# Patient Record
Sex: FEMALE | Employment: UNEMPLOYED | URBAN - METROPOLITAN AREA
[De-identification: names, ages, dates, MRNs, and addresses within clinical notes are randomized per-mention and may not be internally consistent; named-entity substitution may affect disease eponyms.]

---
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
## 2024-01-22 ENCOUNTER — OFFICE VISIT (OUTPATIENT)
Dept: OBGYN CLINIC | Facility: CLINIC | Age: 19
End: 2024-01-22
Payer: MEDICAID

## 2024-01-22 VITALS
WEIGHT: 155 LBS | BODY MASS INDEX: 27.46 KG/M2 | HEART RATE: 74 BPM | DIASTOLIC BLOOD PRESSURE: 65 MMHG | HEIGHT: 63 IN | SYSTOLIC BLOOD PRESSURE: 101 MMHG

## 2024-01-22 DIAGNOSIS — Z30.431 IUD CHECK UP: Primary | ICD-10-CM

## 2024-01-22 PROCEDURE — 99212 OFFICE O/P EST SF 10 MIN: CPT | Performed by: ADVANCED PRACTICE MIDWIFE

## 2024-01-22 PROCEDURE — 3078F DIAST BP <80 MM HG: CPT | Performed by: ADVANCED PRACTICE MIDWIFE

## 2024-01-22 PROCEDURE — 3074F SYST BP LT 130 MM HG: CPT | Performed by: ADVANCED PRACTICE MIDWIFE

## 2024-01-22 PROCEDURE — 3008F BODY MASS INDEX DOCD: CPT | Performed by: ADVANCED PRACTICE MIDWIFE

## 2024-01-23 NOTE — PROGRESS NOTES
Subjective:   Patient ID: Kira Don is a 18 year old female.    Kira presents for IUD check. She states she is concerned that it may have gotten pulled. She denies bleeding or pelvic pain.         History/Other:   Review of Systems   All other systems reviewed and are negative.    Current Outpatient Medications   Medication Sig Dispense Refill    miSOPROStol 200 MCG Oral Tab  (Patient not taking: Reported on 6/12/2023)       Allergies:No Known Allergies    Objective:   Physical Exam  Vitals and nursing note reviewed.   Constitutional:       General: She is not in acute distress.     Appearance: Normal appearance. She is not ill-appearing, toxic-appearing or diaphoretic.   Cardiovascular:      Pulses: Normal pulses.   Genitourinary:     General: Normal vulva.      Labia:         Right: No rash, tenderness, lesion or injury.         Left: No rash, tenderness, lesion or injury.       Vagina: No signs of injury and foreign body. No vaginal discharge, erythema, tenderness, bleeding, lesions or prolapsed vaginal walls.      Cervix: No cervical motion tenderness, discharge, friability, lesion, erythema, cervical bleeding or eversion.            Comments: IUD strings visible at cervical os. No plastic parts of IUD felt or visualized  Neurological:      Mental Status: She is alert and oriented to person, place, and time.   Psychiatric:         Mood and Affect: Mood normal.         Behavior: Behavior normal.         Thought Content: Thought content normal.         Judgment: Judgment normal.         Assessment & Plan:   1. IUD check up        No orders of the defined types were placed in this encounter.      Meds This Visit:  Requested Prescriptions      No prescriptions requested or ordered in this encounter       Imaging & Referrals:  None    Reassured patient that IUD is in place.

## 2024-06-24 ENCOUNTER — OFFICE VISIT (OUTPATIENT)
Dept: OBGYN CLINIC | Facility: CLINIC | Age: 19
End: 2024-06-24

## 2024-06-24 VITALS
HEIGHT: 63 IN | DIASTOLIC BLOOD PRESSURE: 69 MMHG | HEART RATE: 97 BPM | WEIGHT: 143 LBS | BODY MASS INDEX: 25.34 KG/M2 | SYSTOLIC BLOOD PRESSURE: 105 MMHG

## 2024-06-24 DIAGNOSIS — Z01.419 ENCOUNTER FOR ANNUAL ROUTINE GYNECOLOGICAL EXAMINATION: Primary | ICD-10-CM

## 2024-06-24 DIAGNOSIS — N89.8 VAGINAL DISCHARGE: ICD-10-CM

## 2024-06-24 PROCEDURE — 99395 PREV VISIT EST AGE 18-39: CPT | Performed by: ADVANCED PRACTICE MIDWIFE

## 2024-06-24 NOTE — PROGRESS NOTES
Chief Complaint:   Chief Complaint   Patient presents with    Annual     Annual; wanted to do a full STI panel. Wanted to check to see if IUD is in place as well. She's been seeing Brown discharge.         HPI:     Kira is 18 year old female, here today for annual exam  No LMP recorded (lmp unknown). (Menstrual status: IUD - Intrauterine Device).. Not having periods with IUD     Denies abnormal discharge or vaginal irritation.     Had bleeding with intercourse the other night.  Also has brownish discharge. Has been having intermittent pelvic pain/cramp. Denies itching/irritation.    Ex-boyfriend had cheated on her so desires STI testing    Current Partners: currently one. Feels safe in relationship.   Birth Control Method: IUD, happy with method, denies side effects  H/O of STI's: no  Last STI screen: 6/2023  Desires this testing today    HISTORY:  History reviewed. No pertinent past medical history.   History reviewed. No pertinent surgical history.   Family History   Problem Relation Age of Onset    Lipids Father     Lipids Paternal Grandmother     Lipids Paternal Grandfather     Heart Disorder Paternal Grandfather         CAD    Cancer Neg     Diabetes Neg     Hypertension Neg     Thyroid disease Neg       Social History:   Social History     Socioeconomic History    Marital status: Single   Tobacco Use    Smoking status: Never    Smokeless tobacco: Never   Substance and Sexual Activity    Alcohol use: No    Drug use: No   Other Topics Concern    Second-hand smoke exposure No    Alcohol/drug concerns No    Violence concerns No        Medications (Active prior to today's visit):  Current Outpatient Medications   Medication Sig Dispense Refill    miSOPROStol 200 MCG Oral Tab  (Patient not taking: Reported on 6/24/2024)         Allergies:  No Known Allergies    HISTORY:  History reviewed. No pertinent past medical history.   History reviewed. No pertinent surgical history.   Family History   Problem Relation Age  of Onset    Lipids Father     Lipids Paternal Grandmother     Lipids Paternal Grandfather     Heart Disorder Paternal Grandfather         CAD    Cancer Neg     Diabetes Neg     Hypertension Neg     Thyroid disease Neg       Social History:   Social History     Socioeconomic History    Marital status: Single   Tobacco Use    Smoking status: Never    Smokeless tobacco: Never   Substance and Sexual Activity    Alcohol use: No    Drug use: No   Other Topics Concern    Second-hand smoke exposure No    Alcohol/drug concerns No    Violence concerns No        Medications (Active prior to today's visit):  Current Outpatient Medications   Medication Sig Dispense Refill    miSOPROStol 200 MCG Oral Tab  (Patient not taking: Reported on 6/24/2024)         Allergies:  No Known Allergies       ROS:     Cardiovascular:  Negative forirregular heartbeat/palpitations. Negative for chest pain  Constitutional:  Negative for decreased activity, fever, irritability and lethargy  Gastrointestinal:  Negative for abdominal pain, constipation, decreased appetite, diarrhea and vomiting  Genitourinary:  Negative for dysuria and hematuria  Reproductive: Negative for vaginal discharge, itching, abnormal bleeding  Hema/Lymph:  Negative for easy bleeding and easy bruising  Neurological:  Negative for gait disturbance or dizziness  Psychiatric:  Negative for inappropriate interaction and psychiatric symptoms  Respiratory:  Negative for cough, dyspnea and wheezing      PHYSICAL EXAM:   Constitutional: appears well hydrated alert and responsive no acute distress noted  Neck/Thyroid: neck is supple without adenopathy No thyromegaly  Lymphatic: no abnormal cervical, supraclavicular or axillary adenopathy is noted  Breast: normal on inspection without masses  Respiratory: normal to inspection lungs are clear to auscultation bilaterally normal respiratory effort  Cardiovascular: regular rate and rhythm no murmurs, gallups, or rubs  Abdomen: soft  non-tender non-distended no organomegaly noted no masses  Genitourinary: normal female genitalia            Vulva/Labia: Appear normal, no lesions           Vagina: Normal. Small tan discharge           Cervix: No CMT, no mucopurulent discharge. IUD strings visible at cervix           Uterus: non-tender, no masses, not enlarged            Adnexa: not enlarged, non--tender  Rectal: anus appears normal  Musculoskeletal: Normal strength, no swelling  Integumentary: Warm, Dry, appropriate color, Intact  Neurologic: Alert, Oriented  Psychiatric: Cooperative, appropriate mood and affect            ASSESSMENT/PLAN:   Assessment   Encounter Diagnoses   Name Primary?    Vaginal discharge     Encounter for annual routine gynecological examination Yes       Normal gyne exam.   STD testing- GC/CT ordered. Declines HIV, Treponemal, Hep B ordered  Diet/excercise discussed  Vaginal culture collected due to bleeding and discharge  Reassured patient that occasional bleeding is normal with IUD.   Recommend condoms for STI prevention         Orders This Visit:  Orders Placed This Encounter   Procedures    Chlamydia/Gc Amplification    Vaginitis Vaginosis PCR Panel       Meds This Visit:  Requested Prescriptions      No prescriptions requested or ordered in this encounter       Imaging & Referrals:  None     6/24/2024  Celeste Iniguez CNM

## 2024-06-25 LAB
BV BACTERIA DNA VAG QL NAA+PROBE: NEGATIVE
C GLABRATA DNA VAG QL NAA+PROBE: NEGATIVE
C KRUSEI DNA VAG QL NAA+PROBE: NEGATIVE
C TRACH DNA SPEC QL NAA+PROBE: NEGATIVE
CANDIDA DNA VAG QL NAA+PROBE: NEGATIVE
N GONORRHOEA DNA SPEC QL NAA+PROBE: NEGATIVE
T VAGINALIS DNA VAG QL NAA+PROBE: NEGATIVE

## 2024-09-22 ENCOUNTER — HOSPITAL ENCOUNTER (OUTPATIENT)
Age: 19
Discharge: HOME | End: 2024-09-22
Payer: MEDICAID

## 2024-09-22 ENCOUNTER — APPOINTMENT (OUTPATIENT)
Dept: GENERAL RADIOLOGY | Age: 19
End: 2024-09-22
Attending: NURSE PRACTITIONER
Payer: MEDICAID

## 2024-09-22 VITALS
OXYGEN SATURATION: 100 % | HEART RATE: 78 BPM | SYSTOLIC BLOOD PRESSURE: 101 MMHG | TEMPERATURE: 98 F | DIASTOLIC BLOOD PRESSURE: 57 MMHG | RESPIRATION RATE: 20 BRPM

## 2024-09-22 DIAGNOSIS — M25.561 ACUTE PAIN OF RIGHT KNEE: Primary | ICD-10-CM

## 2024-09-22 PROCEDURE — 99213 OFFICE O/P EST LOW 20 MIN: CPT | Performed by: NURSE PRACTITIONER

## 2024-09-22 PROCEDURE — 73560 X-RAY EXAM OF KNEE 1 OR 2: CPT | Performed by: NURSE PRACTITIONER

## 2024-09-22 PROCEDURE — A6450 LT COMPRES BAND >=5"/YD: HCPCS | Performed by: NURSE PRACTITIONER

## 2024-09-22 NOTE — DISCHARGE INSTRUCTIONS
Ace wrap during the day take off at night  Ibuprofen 600 mg every 6 hours as needed, take with food  Follow-up with Ju Terry as discussed

## 2024-09-22 NOTE — ED PROVIDER NOTES
Patient Seen in: [facility]      History     Chief Complaint   Patient presents with    Knee Pain     Stated Complaint: right knee pain    Subjective:   Patient is a 18-year-old female who presents today for right knee pain ongoing for 3 days.  No injury or trauma.  No numbness or tingling.  Does report she feels like her knee sometimes gives out.        Objective:   History reviewed. No pertinent past medical history.           History reviewed. No pertinent surgical history.             Social History     Socioeconomic History    Marital status: Single   Tobacco Use    Smoking status: Never    Smokeless tobacco: Never   Substance and Sexual Activity    Alcohol use: No    Drug use: No   Other Topics Concern    Second-hand smoke exposure No    Alcohol/drug concerns No    Violence concerns No              Review of Systems   All other systems reviewed and are negative.      Positive for stated Chief Complaint: Knee Pain    Other systems are as noted in HPI.  Constitutional and vital signs reviewed.      All other systems reviewed and negative except as noted above.    Physical Exam     ED Triage Vitals [09/22/24 1054]   /57   Pulse 78   Resp 20   Temp 97.8 °F (36.6 °C)   Temp src Temporal   SpO2 100 %   O2 Device None (Room air)       Current Vitals:   Vital Signs  BP: 101/57  Pulse: 78  Resp: 20  Temp: 97.8 °F (36.6 °C)  Temp src: Temporal    Oxygen Therapy  SpO2: 100 %  O2 Device: None (Room air)            Physical Exam  Constitutional:       Appearance: Normal appearance.   Musculoskeletal:      Right upper leg: Normal.      Right knee: Bony tenderness (overlying patella) present. No swelling or erythema. Normal range of motion. Abnormal patellar mobility.      Right lower leg: Normal.      Right ankle: Normal.      Right foot: Normal.   Neurological:      Mental Status: She is alert.         ED Course   Labs Reviewed - No data to display    MDM        Medical Decision Making  Differentials  considered: Right knee fracture versus right knee sprain versus patellar instability versus patellofemoral syndrome versus other    No fracture on right knee x-ray: No injury to suggest a sprain.  Suspect patellar instability, patella seems to be more mobile than is typical in comparison to the left knee.  Patient was placed in Ace wrap, and discussed ibuprofen for pain.  She was advised to follow-up closely with orthopedics, phone number provided.  Patient verbalized understanding and agreeable to plan of care.     Amount and/or Complexity of Data Reviewed  Radiology: ordered and independent interpretation performed. Decision-making details documented in ED Course.     Details: I personally reviewed right knee x-ray: No fracture    Risk  OTC drugs.        Disposition and Plan     Clinical Impression:  1. Acute pain of right knee         Disposition:  Discharge  9/22/2024 12:04 pm    Follow-up:  Ju Terry PA  [address] [place] [id number]  [phone]    Call             Medications Prescribed:  Discharge Medication List as of 9/22/2024 12:04 PM

## 2024-09-22 NOTE — ED INITIAL ASSESSMENT (HPI)
Pt presents to the IC with c/o right knee pain since Thursday, atraumatic. Pt notes intermittent numbness while walking. No obvious deformity, swelling, redness or tenderness. Pt feels like her knee \"pops\" and occasionally feels like it may give out when walking. Nothing taken for pain.

## 2024-11-22 ENCOUNTER — HOSPITAL ENCOUNTER (OUTPATIENT)
Age: 19
Discharge: HOME | End: 2024-11-22

## 2024-11-22 VITALS
HEART RATE: 71 BPM | TEMPERATURE: 100 F | DIASTOLIC BLOOD PRESSURE: 69 MMHG | RESPIRATION RATE: 18 BRPM | OXYGEN SATURATION: 96 % | SYSTOLIC BLOOD PRESSURE: 118 MMHG

## 2024-11-22 DIAGNOSIS — J06.9 UPPER RESPIRATORY TRACT INFECTION, UNSPECIFIED TYPE: Primary | ICD-10-CM

## 2024-11-22 DIAGNOSIS — J02.0 STREPTOCOCCAL SORE THROAT: ICD-10-CM

## 2024-11-22 LAB
S PYO AG THROAT QL: POSITIVE
SARS-COV-2 RNA RESP QL NAA+PROBE: NOT DETECTED

## 2024-11-22 PROCEDURE — 87880 STREP A ASSAY W/OPTIC: CPT | Performed by: PHYSICIAN ASSISTANT

## 2024-11-22 PROCEDURE — 99213 OFFICE O/P EST LOW 20 MIN: CPT | Performed by: PHYSICIAN ASSISTANT

## 2024-11-22 PROCEDURE — U0002 COVID-19 LAB TEST NON-CDC: HCPCS | Performed by: PHYSICIAN ASSISTANT

## 2024-11-22 RX ORDER — AMOXICILLIN 500 MG/1
500 TABLET, FILM COATED ORAL 3 TIMES DAILY
Qty: 30 TABLET | Refills: 0 | Status: SHIPPED | OUTPATIENT
Start: 2024-11-22 | End: 2024-12-02

## 2024-11-22 RX ORDER — BENZOCAINE AND MENTHOL, UNSPECIFIED FORM 15; 2.3 MG/1; MG/1
1 LOZENGE ORAL 3 TIMES DAILY PRN
Qty: 16 LOZENGE | Refills: 0 | Status: SHIPPED | OUTPATIENT
Start: 2024-11-22

## 2024-11-23 NOTE — ED PROVIDER NOTES
Chief Complaint   Patient presents with    Sore Throat       HPI:     Kira Don is a 18 year old female who presents for evaluation of sore throat and periodic cough over the last week.  No sick contacts or exposures.  Denies history of previous pneumonitis bronchitis.  Denies recent antibiotic.  Taking periodic NyQuil DayQuil without significant improvement.  Denies associated headache dizziness earache  dysphagia neck pain chest pain shortness of breath abdominal pain vomiting diarrhea dysuria rash      PFSH    PFSH asessment screens reviewed and agree.  Nurses notes reviewed I agree with documentation.    Family History   Problem Relation Age of Onset    Lipids Father     Lipids Paternal Grandmother     Lipids Paternal Grandfather     Heart Disorder Paternal Grandfather         CAD    Cancer Neg     Diabetes Neg     Hypertension Neg     Thyroid disease Neg      Family history reviewed with patient/caregiver and is not pertinent to presenting problem.  Social History     Socioeconomic History    Marital status: Single     Spouse name: Not on file    Number of children: Not on file    Years of education: Not on file    Highest education level: Not on file   Occupational History    Not on file   Tobacco Use    Smoking status: Never    Smokeless tobacco: Never   Substance and Sexual Activity    Alcohol use: No    Drug use: No    Sexual activity: Not on file   Other Topics Concern    Second-hand smoke exposure No    Alcohol/drug concerns No    Violence concerns No   Social History Narrative    Not on file     Social Drivers of Health     Financial Resource Strain: Not on file   Food Insecurity: Not on file   Transportation Needs: Not on file   Physical Activity: Not on file   Stress: Not on file   Social Connections: Not on file   Housing Stability: Not on file         ROS:   Positive for stated complaint: Sore throat cough.  All other systems reviewed and negative except as noted above.  Constitutional  and Vital Signs Reviewed.      Physical Exam:     Findings:    /69   Pulse 71   Temp 99.7 °F (37.6 °C) (Oral)   Resp 18   LMP  (LMP Unknown)   SpO2 96%   GENERAL: well developed, well nourished, well hydrated, no distress  SKIN: good skin turgor, no obvious rashes  NECK: supple, no adenopathy  EXTREMITIES: no cyanosis or edema. PARISI without difficulty  GI: soft, non-tender, normal bowel sounds  HEAD: normocephalic, atraumatic  EYES: sclera non icteric bilateral, conjunctiva clear  EARS: TMs clear bilaterally. Canals clear.  NOSE: Mild rhinorrhea.  MMM.  Nasal turbinates: pink, normal mucosa  THROAT: Tonsils +2 of 4 bilaterally with exudates bilaterally.  No visualized PTA.   uvula midline, and airway patent  LUNGS: No retractions.  Clear to auscultation bilaterally; no rales, rhonchi, or wheezes  NEURO: No focal deficits  PSYCH: Alert and oriented x3.  Answering questions appropriately.  Mood appropriate.    MDM/Assessment/Plan:   Orders for this encounter:    Orders Placed This Encounter    POCT Rapid Strep    Rapid SARS-CoV-2 by PCR     Order Specific Question:   Release to patient     Answer:   Immediate    POCT Rapid Strep    Benzocaine-Menthol (CEPACOL) 15-2.3 MG Mouth/Throat Lozenge     Sig: Use as directed 1 lozenge in the mouth or throat 3 (three) times daily as needed.     Dispense:  16 lozenge     Refill:  0    amoxicillin 500 MG Oral Tab     Sig: Take 1 tablet (500 mg total) by mouth 3 (three) times daily for 10 days.     Dispense:  30 tablet     Refill:  0       Labs performed this visit:  Recent Results (from the past 10 hours)   Rapid SARS-CoV-2 by PCR    Collection Time: 11/22/24  6:34 PM    Specimen: Nares; Other   Result Value Ref Range    Rapid SARS-CoV-2 by PCR Not Detected Not Detected   POCT Rapid Strep    Collection Time: 11/22/24  6:42 PM   Result Value Ref Range    POCT Rapid Strep Positive (A) Negative       MDM:  Strep positive, coronavirus negative, instructed potential  pneumonitis based on history, patient declining x-ray after initial discussion will readdress if worsening lingering symptoms next week and start antibiotics for strep.  Happy with plan of care.    Diagnosis:    ICD-10-CM    1. Upper respiratory tract infection, unspecified type  J06.9 Rapid SARS-CoV-2 by PCR     Rapid SARS-CoV-2 by PCR      2. Streptococcal sore throat  J02.0           All results reviewed and discussed with patient.  See AVS for detailed discharge instructions for your condition today.    Follow Up with:  Fady Eldridge MD  [address] [place] [id number] [phone]    Schedule an appointment as soon as possible for a visit in 1 week  As needed, If symptoms worsen

## 2025-02-17 ENCOUNTER — OFFICE VISIT (OUTPATIENT)
Dept: OBGYN CLINIC | Facility: CLINIC | Age: 20
End: 2025-02-17

## 2025-02-17 VITALS
BODY MASS INDEX: 23.21 KG/M2 | HEIGHT: 63 IN | DIASTOLIC BLOOD PRESSURE: 66 MMHG | HEART RATE: 89 BPM | WEIGHT: 131 LBS | SYSTOLIC BLOOD PRESSURE: 101 MMHG

## 2025-02-17 DIAGNOSIS — Z30.431 IUD CHECK UP: Primary | ICD-10-CM

## 2025-02-17 DIAGNOSIS — Z11.3 SCREEN FOR STD (SEXUALLY TRANSMITTED DISEASE): ICD-10-CM

## 2025-02-17 DIAGNOSIS — N89.8 VAGINAL ODOR: ICD-10-CM

## 2025-02-17 DIAGNOSIS — N89.8 VAGINAL DISCHARGE: ICD-10-CM

## 2025-02-17 PROCEDURE — 99213 OFFICE O/P EST LOW 20 MIN: CPT | Performed by: ADVANCED PRACTICE MIDWIFE

## 2025-02-18 PROBLEM — Z97.5 IUD (INTRAUTERINE DEVICE) IN PLACE: Status: ACTIVE | Noted: 2025-02-18

## 2025-02-18 LAB
BV BACTERIA DNA VAG QL NAA+PROBE: NEGATIVE
C GLABRATA DNA VAG QL NAA+PROBE: NEGATIVE
C KRUSEI DNA VAG QL NAA+PROBE: NEGATIVE
C TRACH DNA SPEC QL NAA+PROBE: NEGATIVE
CANDIDA DNA VAG QL NAA+PROBE: POSITIVE
N GONORRHOEA DNA SPEC QL NAA+PROBE: NEGATIVE
T VAGINALIS DNA VAG QL NAA+PROBE: NEGATIVE

## 2025-02-18 RX ORDER — FLUCONAZOLE 150 MG/1
150 TABLET ORAL
Qty: 2 TABLET | Refills: 0 | Status: SHIPPED | OUTPATIENT
Start: 2025-02-18

## 2025-02-18 NOTE — PROGRESS NOTES
Subjective:   Patient ID: Kira Don is a 19 year old female.    Kira presents for IUD check. Reports pink, brown, yellow discharge. Also has odor. Denies itching/irritations, pelvic pain, fevers.    Sexually active with 2 male partners. Feels safe with partners.         History/Other:   Review of Systems   All other systems reviewed and are negative.    Current Outpatient Medications   Medication Sig Dispense Refill    Benzocaine-Menthol (CEPACOL) 15-2.3 MG Mouth/Throat Lozenge Use as directed 1 lozenge in the mouth or throat 3 (three) times daily as needed. 16 lozenge 0     Allergies:Allergies[1]    Objective:   Physical Exam  Vitals and nursing note reviewed.   Constitutional:       General: She is not in acute distress.     Appearance: Normal appearance. She is normal weight. She is not ill-appearing, toxic-appearing or diaphoretic.   Genitourinary:     General: Normal vulva.      Vagina: No signs of injury and foreign body. Vaginal discharge (creamy) present. No erythema, tenderness, bleeding, lesions or prolapsed vaginal walls.      Cervix: Friability present. No cervical motion tenderness, discharge, lesion, erythema, cervical bleeding or eversion.      Comments: IUD strings visible at cervical os  Neurological:      Mental Status: She is alert and oriented to person, place, and time.   Psychiatric:         Mood and Affect: Mood normal.         Behavior: Behavior normal.         Thought Content: Thought content normal.         Judgment: Judgment normal.         Assessment & Plan:   1. IUD check up    2. Vaginal discharge    3. Vaginal odor    4. Screen for STD (sexually transmitted disease)        Orders Placed This Encounter   Procedures    Vaginitis Vaginosis PCR Panel    Chlamydia/Gc Amplification     IUD in place  Vaginal culture and GC/CT collected and sent. Will treat if indicated.  Meds This Visit:  Requested Prescriptions      No prescriptions requested or ordered in this encounter        Imaging & Referrals:  None         [1] No Known Allergies

## 2025-02-20 ENCOUNTER — TELEPHONE (OUTPATIENT)
Dept: OBGYN CLINIC | Facility: CLINIC | Age: 20
End: 2025-02-20

## 2025-02-20 NOTE — TELEPHONE ENCOUNTER
Pt verified name and .    Pt requesting Diflucan rx be sent to [facility] in [place] on [facility]. Informed pt rx was sent to requested pharmacy. Phone number of pharmacy verified with pt. Pt verbalized understanding and agreed.

## 2025-02-20 NOTE — TELEPHONE ENCOUNTER
Patient will like prescription from the other day sent to [facility] at [address], [place] [address]. Please advise

## 2025-04-05 ENCOUNTER — APPOINTMENT (OUTPATIENT)
Dept: GENERAL RADIOLOGY | Facility: HOSPITAL | Age: 20
End: 2025-04-05
Payer: COMMERCIAL

## 2025-04-05 ENCOUNTER — HOSPITAL ENCOUNTER (OUTPATIENT)
Age: 20
Discharge: ANOTHER HOSPITAL | End: 2025-04-05
Attending: STUDENT IN AN ORGANIZED HEALTH CARE EDUCATION/TRAINING PROGRAM
Payer: COMMERCIAL

## 2025-04-05 ENCOUNTER — HOSPITAL ENCOUNTER (INPATIENT)
Facility: HOSPITAL | Age: 20
LOS: 2 days | Discharge: HOME | End: 2025-04-07
Attending: EMERGENCY MEDICINE | Admitting: INTERNAL MEDICINE
Payer: COMMERCIAL

## 2025-04-05 VITALS
RESPIRATION RATE: 20 BRPM | SYSTOLIC BLOOD PRESSURE: 132 MMHG | HEART RATE: 79 BPM | DIASTOLIC BLOOD PRESSURE: 78 MMHG | OXYGEN SATURATION: 92 % | TEMPERATURE: 100 F

## 2025-04-05 DIAGNOSIS — J02.0 STREP PHARYNGITIS: ICD-10-CM

## 2025-04-05 DIAGNOSIS — R06.02 SHORTNESS OF BREATH: ICD-10-CM

## 2025-04-05 DIAGNOSIS — J98.2 PNEUMOMEDIASTINUM (HCC): Primary | ICD-10-CM

## 2025-04-05 DIAGNOSIS — R06.2 WHEEZING: Primary | ICD-10-CM

## 2025-04-05 LAB
ANION GAP SERPL CALC-SCNC: 12 MMOL/L (ref 0–18)
BASOPHILS # BLD AUTO: 0.04 X10(3) UL (ref 0–0.2)
BASOPHILS NFR BLD AUTO: 0.3 %
BUN BLD-MCNC: 7 MG/DL (ref 9–23)
BUN/CREAT SERPL: 8.4 (ref 10–20)
CALCIUM BLD-MCNC: 9.1 MG/DL (ref 8.7–10.4)
CHLORIDE SERPL-SCNC: 103 MMOL/L (ref 98–112)
CO2 SERPL-SCNC: 24 MMOL/L (ref 21–32)
CREAT BLD-MCNC: 0.83 MG/DL
DEPRECATED RDW RBC AUTO: 42.4 FL (ref 35.1–46.3)
EGFRCR SERPLBLD CKD-EPI 2021: 103 ML/MIN/1.73M2 (ref 60–?)
EOSINOPHIL # BLD AUTO: 0.04 X10(3) UL (ref 0–0.7)
EOSINOPHIL NFR BLD AUTO: 0.3 %
ERYTHROCYTE [DISTWIDTH] IN BLOOD BY AUTOMATED COUNT: 12.4 % (ref 11–15)
GLUCOSE BLD-MCNC: 106 MG/DL (ref 70–99)
HCT VFR BLD AUTO: 39.8 %
HGB BLD-MCNC: 13.5 G/DL
IMM GRANULOCYTES # BLD AUTO: 0.05 X10(3) UL (ref 0–1)
IMM GRANULOCYTES NFR BLD: 0.4 %
LYMPHOCYTES # BLD AUTO: 0.83 X10(3) UL (ref 1–4)
LYMPHOCYTES NFR BLD AUTO: 5.9 %
MCH RBC QN AUTO: 31.1 PG (ref 26–34)
MCHC RBC AUTO-ENTMCNC: 33.9 G/DL (ref 31–37)
MCV RBC AUTO: 91.7 FL
MONOCYTES # BLD AUTO: 0.77 X10(3) UL (ref 0.1–1)
MONOCYTES NFR BLD AUTO: 5.5 %
NEUTROPHILS # BLD AUTO: 12.31 X10 (3) UL (ref 1.5–7.7)
NEUTROPHILS # BLD AUTO: 12.31 X10(3) UL (ref 1.5–7.7)
NEUTROPHILS NFR BLD AUTO: 87.6 %
OSMOLALITY SERPL CALC.SUM OF ELEC: 286 MOSM/KG (ref 275–295)
PLATELET # BLD AUTO: 238 10(3)UL (ref 150–450)
POCT INFLUENZA A: NEGATIVE
POCT INFLUENZA B: NEGATIVE
POTASSIUM SERPL-SCNC: 3.5 MMOL/L (ref 3.5–5.1)
RBC # BLD AUTO: 4.34 X10(6)UL
S PYO AG THROAT QL IA.RAPID: POSITIVE
SARS-COV-2 RNA RESP QL NAA+PROBE: NOT DETECTED
SODIUM SERPL-SCNC: 139 MMOL/L (ref 136–145)
WBC # BLD AUTO: 14 X10(3) UL (ref 4–11)

## 2025-04-05 PROCEDURE — 71045 X-RAY EXAM CHEST 1 VIEW: CPT | Performed by: EMERGENCY MEDICINE

## 2025-04-05 PROCEDURE — 87651 STREP A DNA AMP PROBE: CPT | Performed by: STUDENT IN AN ORGANIZED HEALTH CARE EDUCATION/TRAINING PROGRAM

## 2025-04-05 PROCEDURE — 87502 INFLUENZA DNA AMP PROBE: CPT | Performed by: STUDENT IN AN ORGANIZED HEALTH CARE EDUCATION/TRAINING PROGRAM

## 2025-04-05 PROCEDURE — 99213 OFFICE O/P EST LOW 20 MIN: CPT

## 2025-04-05 PROCEDURE — 99223 1ST HOSP IP/OBS HIGH 75: CPT | Performed by: INTERNAL MEDICINE

## 2025-04-05 RX ORDER — BISACODYL 10 MG
10 SUPPOSITORY, RECTAL RECTAL
Status: DISCONTINUED | OUTPATIENT
Start: 2025-04-05 | End: 2025-04-07

## 2025-04-05 RX ORDER — IPRATROPIUM BROMIDE AND ALBUTEROL SULFATE 2.5; .5 MG/3ML; MG/3ML
3 SOLUTION RESPIRATORY (INHALATION) EVERY 4 HOURS PRN
Status: DISCONTINUED | OUTPATIENT
Start: 2025-04-05 | End: 2025-04-07

## 2025-04-05 RX ORDER — ONDANSETRON 2 MG/ML
4 INJECTION INTRAMUSCULAR; INTRAVENOUS EVERY 6 HOURS PRN
Status: DISCONTINUED | OUTPATIENT
Start: 2025-04-05 | End: 2025-04-07

## 2025-04-05 RX ORDER — PROCHLORPERAZINE EDISYLATE 5 MG/ML
5 INJECTION INTRAMUSCULAR; INTRAVENOUS EVERY 8 HOURS PRN
Status: DISCONTINUED | OUTPATIENT
Start: 2025-04-05 | End: 2025-04-07

## 2025-04-05 RX ORDER — AMOXICILLIN 500 MG/1
500 CAPSULE ORAL EVERY 12 HOURS SCHEDULED
Status: DISCONTINUED | OUTPATIENT
Start: 2025-04-05 | End: 2025-04-05

## 2025-04-05 RX ORDER — HEPARIN SODIUM 5000 [USP'U]/ML
5000 INJECTION, SOLUTION INTRAVENOUS; SUBCUTANEOUS EVERY 8 HOURS SCHEDULED
Status: DISCONTINUED | OUTPATIENT
Start: 2025-04-05 | End: 2025-04-07

## 2025-04-05 RX ORDER — METHYLPREDNISOLONE SODIUM SUCCINATE 125 MG/2ML
60 INJECTION INTRAMUSCULAR; INTRAVENOUS ONCE
Status: COMPLETED | OUTPATIENT
Start: 2025-04-05 | End: 2025-04-05

## 2025-04-05 RX ORDER — IPRATROPIUM BROMIDE AND ALBUTEROL SULFATE 2.5; .5 MG/3ML; MG/3ML
3 SOLUTION RESPIRATORY (INHALATION) ONCE
Status: COMPLETED | OUTPATIENT
Start: 2025-04-05 | End: 2025-04-05

## 2025-04-05 RX ORDER — SENNOSIDES 8.6 MG
17.2 TABLET ORAL NIGHTLY PRN
Status: DISCONTINUED | OUTPATIENT
Start: 2025-04-05 | End: 2025-04-07

## 2025-04-05 RX ORDER — BENZONATATE 200 MG/1
200 CAPSULE ORAL 3 TIMES DAILY PRN
Status: DISCONTINUED | OUTPATIENT
Start: 2025-04-05 | End: 2025-04-07

## 2025-04-05 RX ORDER — CODEINE PHOSPHATE AND GUAIFENESIN 10; 100 MG/5ML; MG/5ML
5 SOLUTION ORAL EVERY 4 HOURS PRN
Status: DISCONTINUED | OUTPATIENT
Start: 2025-04-05 | End: 2025-04-07

## 2025-04-05 RX ORDER — SODIUM PHOSPHATE, DIBASIC AND SODIUM PHOSPHATE, MONOBASIC 7; 19 G/230ML; G/230ML
1 ENEMA RECTAL ONCE AS NEEDED
Status: DISCONTINUED | OUTPATIENT
Start: 2025-04-05 | End: 2025-04-07

## 2025-04-05 RX ORDER — POLYETHYLENE GLYCOL 3350 17 G/17G
17 POWDER, FOR SOLUTION ORAL DAILY PRN
Status: DISCONTINUED | OUTPATIENT
Start: 2025-04-05 | End: 2025-04-07

## 2025-04-05 RX ORDER — ACETAMINOPHEN 500 MG
1000 TABLET ORAL ONCE
Status: COMPLETED | OUTPATIENT
Start: 2025-04-05 | End: 2025-04-05

## 2025-04-05 RX ORDER — METHYLPREDNISOLONE SODIUM SUCCINATE 125 MG/2ML
60 INJECTION INTRAMUSCULAR; INTRAVENOUS EVERY 12 HOURS
Status: DISCONTINUED | OUTPATIENT
Start: 2025-04-06 | End: 2025-04-06

## 2025-04-05 NOTE — ED QUICK NOTES
Patient ID: Nomi is a 82 year old male.    Chief Complaint   Patient presents with   • Pain     Right hip and lower back x couple years   • Medicare Wellness Visit       MEDICARE WELLNESS VISIT NOTE    HISTORY OF PRESENT ILLNESS:   Nomi Craft presents for his Subsequent Annual Medicare Wellness Visit.   He has complaints or concerns which include right hip and lower back pain..         Patient continues to have back pain at times radiates down left or right leg.    Cyclobenzaprine does help alleviate pain      Social history     2 children  Retired     Patient Care Team:  Nomi Moreno DO as PCP - General (Family Practice)   Hematology: Dr. Yates        Patient Active Problem List   Diagnosis   • Hypothyroid   • Hypertension   • Atherosclerosis of native coronary artery of native heart without angina pectoris   • YUDY on CPAP   • Osteoporosis   • S/P CABG (coronary artery bypass graft)   • Asymptomatic carotid artery stenosis, bilateral   • Hyperlipidemia, mixed   • Primary osteoarthritis of right shoulder   • Multiple myeloma not having achieved remission (CMS/[facility])         Past Medical History:   Diagnosis Date   • Asymptomatic carotid artery stenosis, bilateral 12/28/2015   • Hypertension    • Hypothyroid 11/28/2018   • Multiple myeloma (CMS/HCC) 08/06/2015   • YUDY on CPAP 11/28/2018   • Osteoporosis 04/21/2009   • Other hyperlipidemia    • Personal history of colonic polyps 11/28/2018   • S/P CABG (coronary artery bypass graft) 04/21/2009         Past Surgical History:   Procedure Laterality Date   • Appendectomy     • Cholecystectomy     • Coronary artery bypass graft  04/1998    CABG X 1 LIMA to LAD, via [facility] procedure   • Extracapsular cataract removal w insert io lens prosth wo ecp Bilateral    • Tonsillectomy           Social History     Tobacco Use   • Smoking status: Former Smoker     Packs/day: 0.00     Types: Cigarettes   • Smokeless tobacco: Former User   • Tobacco comment: Former  Seen and examined by dr kaur, pt to go to ed for further eval and management   tobacco use. smoked for about 1 year or less   Substance Use Topics   • Alcohol use: Never     Comment: drinks rarely   • Drug use: Never     Drug use:    Drug Use:    Never           Family History   Problem Relation Age of Onset   • Coronary Artery Disease Father    • Cancer Father         Skin cancer   • Stroke Father    • Cancer Brother         Skin cancer   • Aneurysm Neg Hx         Negative for AAA       Current Outpatient Medications   Medication Sig Dispense Refill   • amitriptyline (ELAVIL) 10 MG tablet Take 4 tabs nightly 360 tablet 3   • apixaBAN (Eliquis) 5 MG Tab Take 1 tablet by mouth every 12 hours. 180 tablet 3   • Klor-Con M 10 MEQ carlos ER tablet Take 2 tablets by mouth daily. 180 tablet 3   • valsartan (DIOVAN) 320 MG tablet Take 1 tablet by mouth daily. 90 tablet 3   • levothyroxine (Synthroid) 75 MCG tablet Take 1 tablet by mouth daily. 90 tablet 3   • cyclobenzaprine (FLEXERIL) 10 MG tablet Take 1 tablet by mouth at bedtime as needed for Muscle spasms. 30 tablet 5   • fluorouracil (EFUDEX) 5 % cream Apply topically 2 times daily. To affected areas of scalp 40 g 0   • Revlimid 10 MG capsule Take 1 capsule by mouth daily. TAKE 1 CAPSULE BY MOUTH ONCE DAILY FOR 7 DAYS ON AND 7 DAYS OFF 14 capsule 0   • acyclovir (ZOVIRAX) 200 MG capsule Take 2 capsules by mouth 2 times daily. Take 2 tablest by mouth twice daily 360 capsule 0     No current facility-administered medications for this visit.        The following items on the Medicare Health Risk Assessment were found to be positive            Vision and Hearing screens: Not performed    Advance Directive:   The patient has the following documents:  No Advance Directives on file. Patient offered documents.    Cognitive/Functional Status: no evidence of cognitive dysfunction by direct observation    Opioid Review: Nomi is not taking opioid medications.    Recent PHQ 2/9 Score:    PHQ 2:  Date Adult PHQ 2 Score Adult PHQ 2 Interpretation   4/26/2022 0  No further screening needed       PHQ 9:       DEPRESSION ASSESSMENT/PLAN:  Depression screening is negative no further plan needed.     Body mass index is 31.58 kg/m².    BMI ASSESSMENT/PLAN:  Patient is obese.    30 minutes of physical activity a day        Needed Screening/Treatment:   Cardiovascular screening - Lipids   Needed follow up:  None    See orders.   See Patient Instructions section.   Return in about 6 months (around 12/20/2022), or if symptoms worsen or fail to improve.    Review of Systems:  Review of Systems   All other systems reviewed and are negative.      Physical:  Visit Vitals  /74   Pulse 84   Temp 97.8 °F (36.6 °C) (Temporal)   Ht 5' 8\" (1.727 m)   Wt 94.2 kg (207 lb 11.2 oz)   SpO2 97%   BMI 31.58 kg/m²     Physical Exam  Constitutional:       Appearance: He is well-developed.   HENT:      Head: Normocephalic and atraumatic.      Right Ear: External ear normal.      Left Ear: External ear normal.   Eyes:      Conjunctiva/sclera: Conjunctivae normal.   Cardiovascular:      Rate and Rhythm: Normal rate and regular rhythm.   Pulmonary:      Effort: Pulmonary effort is normal. No respiratory distress.      Breath sounds: Normal breath sounds. No stridor.   Skin:     General: Skin is warm and dry.   Neurological:      Mental Status: He is alert and oriented to person, place, and time.   Psychiatric:         Behavior: Behavior normal.         Thought Content: Thought content normal.         Judgment: Judgment normal.         Nomi was seen today for pain and medicare wellness visit.    Diagnoses and all orders for this visit:    Medicare annual wellness visit, subsequent  -     BASIC METABOLIC PNL (RANDOM); Future    Hypokalemia  -     Klor-Con M 10 MEQ carlos ER tablet; Take 2 tablets by mouth daily.  -     BASIC METABOLIC PNL (RANDOM); Future    Essential hypertension  -     valsartan (DIOVAN) 320 MG tablet; Take 1 tablet by mouth daily.  -     BASIC METABOLIC PNL (RANDOM);  Future    Lumbar radiculopathy  -     XR LUMBAR SPINE 2 OR 3 VIEWS; Future  -     BASIC METABOLIC PNL (RANDOM); Future    Atherosclerosis of native coronary artery of native heart without angina pectoris  -     BASIC METABOLIC PNL (RANDOM); Future    Hyperlipidemia, mixed  -     BASIC METABOLIC PNL (RANDOM); Future    S/P CABG (coronary artery bypass graft)  -     BASIC METABOLIC PNL (RANDOM); Future    Primary hypertension  -     BASIC METABOLIC PNL (RANDOM); Future    Asymptomatic carotid artery stenosis, bilateral  -     BASIC METABOLIC PNL (RANDOM); Future    Multiple myeloma not having achieved remission (CMS/HCC)  -     BASIC METABOLIC PNL (RANDOM); Future    Osteoporosis, unspecified osteoporosis type, unspecified pathological fracture presence  -     BASIC METABOLIC PNL (RANDOM); Future    YUDY on CPAP  -     BASIC METABOLIC PNL (RANDOM); Future    Other orders  -     amitriptyline (ELAVIL) 10 MG tablet; Take 4 tabs nightly  -     apixaBAN (Eliquis) 5 MG Tab; Take 1 tablet by mouth every 12 hours.  -     levothyroxine (Synthroid) 75 MCG tablet; Take 1 tablet by mouth daily.  -     cyclobenzaprine (FLEXERIL) 10 MG tablet; Take 1 tablet by mouth at bedtime as needed for Muscle spasms.      Sinus congestion  Trial of sinus rinses over-the-counter  Flonase as needed    Hypertension  Controlled  Continue present management  Followed by cardiology    CAD status post CABG  With history of statin allergy to numerous statins as well as Zetia  Appears stable from cardiac perspective  Continue present management    Hypokalemia  Resolved  Patient stop furosemide.  Doing well.  Trial of stopping potassium supplement and recheck electrolytes and 2 to 4 weeks    Obstructive sleep apnea on CPAP  Stable  Continue monitor    History of elevated fasting glucose  Check A1c in 6 months    Hypothyroidism  Stable  Continue present management    History of chronic pain with worsening lumbar radiculopathy with associated right hip  pain  Per review medical chart fibromyalgia diagnosis in the past  Lumbar radiculopathy  Reviewed prior MRI  No improvement with steroid injection per patient  X-rays ordered today  will consider MRI  Patient is taking B12 and slight improvement    History of DVT  On chronic anticoagulation  Followed by hematology    Multiple myeloma  Stable  Currently followed by hematology    Osteoporosis  Stable  Followed by hematology    Asymptomatic stable bilateral carotid artery stenosis  Stable  Continue medical management  Followed by cardiology    History of memory loss per review of medical chart  Continue to monitor    Recheck electrolytes in 1 month  Follow-up Dr. Moreno in 6 months have fasting lab work prior to appointment        Risks and benefits of medications discussed at length with the patient.  Refer to orders.    Return to clinic as clinically indicated as discussed with patient who verbalized understanding of & agreement with the plan.    Written handout given.       Electronically signed by: Nomi Moreno DO on  06/20/22            [facility] [place] [address]   [address] [facility] [address]   Phone: [phone]   Fax: [phone]

## 2025-04-05 NOTE — ED PROVIDER NOTES
Patient Seen in: [facility] Emergency Department      History     Chief Complaint   Patient presents with    Shortness Of Breath     Stated Complaint: Shortness of Breath/CP    Subjective:   HPI      19-year-old female without significant past medical history presents with complaints of cough, chest pain, sore throat, wheezing, and dyspnea.  The patient reports symptoms of cough and wheezing over the past 2 to 3 days.  She reports today she began experiencing anterior chest pain.  She reports persistent pain worsened with coughing.  She initially presented to immediate care where she tested positive for strep pharyngitis.  She was hypoxic in the immediate care and was sent to the ED for further evaluation.  She denies history of any known lung issues.  No known fevers.    Objective:     History reviewed. No pertinent past medical history.           History reviewed. No pertinent surgical history.             Social History     Socioeconomic History    Marital status: Single   Tobacco Use    Smoking status: Some Days     Types: Cigarettes    Smokeless tobacco: Never   Vaping Use    Vaping status: Every Day   Substance and Sexual Activity    Alcohol use: Not Currently    Drug use: Not Currently   Other Topics Concern    Second-hand smoke exposure No    Alcohol/drug concerns No    Violence concerns No                  Physical Exam     ED Triage Vitals [04/05/25 1503]   /86   Pulse 71   Resp 22   Temp 99.8 °F (37.7 °C)   Temp src Oral   SpO2 98 %   O2 Device None (Room air)       Current Vitals:   Vital Signs  BP: 123/76  Pulse: 72  Resp: 20  Temp: 99.8 °F (37.7 °C)  Temp src: Oral  MAP (mmHg): 87    Oxygen Therapy  SpO2: 96 %  O2 Device: None (Room air)        Physical Exam    General Appearance: alert, no distress  Eyes: pupils equal and round no pallor or injection  ENT, Mouth: mucous membranes moist.  Bilateral TMs and auditory canals normal-appearing.  Oropharynx with mild erythema.  No exudate  noted.  Uvula midline.  No asymmetry noted.  Respiratory: there are no retractions, faint, scattered wheezes bilaterally  Cardiovascular: regular rate and rhythm  Gastrointestinal:  abdomen is soft and non tender, no masses, bowel sounds normal  Neurological: Speech normal.  Moving extremities x 4.  Skin: warm and dry, no rashes.  Musculoskeletal: neck is supple non tender        Extremities are symmetrical, full range of motion no leg edema or tenderness noted.  Psychiatric: patient is oriented X 3, there is no agitation    DIFFERENTIAL DIAGNOSIS: After history and physical exam differential diagnosis was considered for pneumonia, bronchitis, strep pharyngitis, viral respiratory illness, or other        ED Course     Labs Reviewed   CBC WITH DIFFERENTIAL WITH PLATELET - Abnormal; Notable for the following components:       Result Value    WBC 14.0 (*)     Neutrophil Absolute Prelim 12.31 (*)     Neutrophil Absolute 12.31 (*)     Lymphocyte Absolute 0.83 (*)     All other components within normal limits   BASIC METABOLIC PANEL (8)        Patient tested negative for influenza and COVID-19 at the immediate care earlier today.         MDM      X-ray results noted.  Patient with spontaneous pneumothorax.  She reports that she vapes regularly which may be the cause of the pneumomediastinum.  Discussed with Dr Menard, pulmonology.  He recommends Solu-Medrol, Zosyn, and oxygen.  Will admit for further evaluation and treatment.  Also discussed with Dr. Galdamez, hospitalist..    Admission disposition: 4/5/2025  5:50 PM           Medical Decision Making      Disposition and Plan     Clinical Impression:  1. Pneumomediastinum (HCC)         Disposition:  Admit  4/5/2025  5:50 pm    Follow-up:  No follow-up provider specified.        Medications Prescribed:  Current Discharge Medication List              Supplementary Documentation:         Hospital Problems       Present on Admission  Date Reviewed: 2/18/2025            ICD-10-CM  Noted POA    * (Principal) Pneumomediastinum (HCC) J98.2 4/5/2025 Unknown

## 2025-04-05 NOTE — ED PROVIDER NOTES
Patient Seen in: [facility]      History     Chief Complaint   Patient presents with    Cough/URI     Stated Complaint: Breathing Problem ,Cough,Wheezing,Chest Congested    Subjective:   HPI      19-year-old female with no significant past medical history, does vape, who presents with concern for 2 days of nasal congestion and sore throat but minimal reported sore throat, biggest concern is difficulty breathing and chest pain since this morning, no trauma, no fevers, no history of reactive airway disease.  She reports deep inspiration is very painful limiting her deep breaths.  She has no history of DVT or PE and no recent travel.        Objective:     History reviewed. No pertinent past medical history.           History reviewed. No pertinent surgical history.             Social History     Socioeconomic History    Marital status: Single   Tobacco Use    Smoking status: Some Days     Types: Cigarettes    Smokeless tobacco: Never   Vaping Use    Vaping status: Every Day   Substance and Sexual Activity    Alcohol use: Not Currently    Drug use: Not Currently   Other Topics Concern    Second-hand smoke exposure No    Alcohol/drug concerns No    Violence concerns No              Review of Systems    Positive for stated complaint: Breathing Problem ,Cough,Wheezing,Chest Congested  Other systems are as noted in HPI.  Constitutional and vital signs reviewed.      All other systems reviewed and negative except as noted above.    Physical Exam     ED Triage Vitals [04/05/25 1413]   /78   Pulse 79   Resp 20   Temp 99.6 °F (37.6 °C)   Temp src Oral   SpO2 94 %   O2 Device None (Room air)       Current Vitals:   Vital Signs  BP: 132/78  Pulse: 79  Resp: 20  Temp: 99.6 °F (37.6 °C)  Temp src: Oral    Oxygen Therapy  SpO2: 92 %  O2 Device: None (Room air)        Physical Exam    General: Awake, alert, comfortable on room air, in no distress, tolerating oral secretions, interactive  Pulmonary: Diffuse  expiratory wheezing with prolonged expiratory phase, patient splints with deep inspiration reporting significant chest pain, no recruitment, no obvious distress  Neuro: Symmetrical facial expressions on gross observation  HEENT: No periorbital edema or erythema, nonerythematous and nonedematous intact B/L TMs, no erythema or edema of the B/L ear canals, nonerythematous and nonedematous B/L tonsils, no tonsillar exudates, no peritonsillar edema, uvula midline, tolerating oral secretions, normal speech, no submandibular edema  Psych: Normal mood, normal affect    ED Course     Labs Reviewed   RAPID STREP A - Abnormal; Notable for the following components:       Result Value    Strep A by PCR Positive (*)     All other components within normal limits   POCT FLU TEST - Normal    Narrative:     This assay is a rapid molecular in vitro test utilizing nucleic acid amplification of influenza A and B viral RNA.   RAPID SARS-COV-2 BY PCR - Normal       MDM   Patient is awake, alert, she is in no distress, but there is diffuse expiratory wheezing with prolonged expiratory phase and pleuritic chest pain limiting deep inspiration, no recruitment, nursing reports pulse oxygen saturation of 92% to 94% with good waveform, I am unable to obtain a good waveform at bedside, not tachycardic, no sign of otitis media or otitis externa, no obvious sign of tonsillitis or PTA or deep space infection, but sore throat was reported and therefore viral testing and strep testing was performed by nursing prior to my assessment.  Strep test did result as positive and COVID and influenza testing resulted as negative, but pt does vape.    Given patient's pulse oxygenation of 92% on room air without exertion with associated pleuritic chest pain in the setting of diffuse wheezing and pleuritic chest pain, and limitations of the immediate care, recommended assessment via higher level of care through the emergency department, given pulse oxygen  saturation of 92% on room air with good waveform per nursing, did recommend EMS transport as we did discuss that airway hyperreactivity can rapidly progress to become life-threatening especially with lower pulse oxygenation reportedly at 92%, but patient and her mother declined EMS transport, patient and her mother prefer her mother to drive her to the emergency department, she prefers the [facility] emergency department, I emphasized the importance that they go straight there.      Given unclear disposition for the patient at this time, I did not prescribe antibiotics for strep pharyngitis, as disposition will be determined by the emergency department.  Patient to inform them of her positive strep pharyngitis testing.    Disposition and Plan     Clinical Impression:  1. Wheezing    2. Shortness of breath    3. Strep pharyngitis         Disposition:  Ic to ed  4/5/2025  2:37 pm    Follow-up:  No follow-up provider specified.        Medications Prescribed:  Discharge Medication List as of 4/5/2025  2:38 PM            None

## 2025-04-05 NOTE — ED QUICK NOTES
Orders for admission, patient is aware of plan and ready to go upstairs. Any questions, please call ED RN zander at extension [phone].     Patient Covid vaccination status: Fully vaccinated     COVID Test Ordered in ED: None    COVID Suspicion at Admission: N/A    Running Infusions:  None    Mental Status/LOC at time of transport: aox4    Other pertinent information:   CIWA score: N/A   NIH score:  N/A

## 2025-04-06 LAB
ANION GAP SERPL CALC-SCNC: 9 MMOL/L (ref 0–18)
BASOPHILS # BLD AUTO: 0.01 X10(3) UL (ref 0–0.2)
BASOPHILS NFR BLD AUTO: 0.1 %
BUN BLD-MCNC: 9 MG/DL (ref 9–23)
BUN/CREAT SERPL: 11.3 (ref 10–20)
CALCIUM BLD-MCNC: 9.3 MG/DL (ref 8.7–10.4)
CHLORIDE SERPL-SCNC: 103 MMOL/L (ref 98–112)
CO2 SERPL-SCNC: 27 MMOL/L (ref 21–32)
CREAT BLD-MCNC: 0.8 MG/DL
DEPRECATED RDW RBC AUTO: 42.4 FL (ref 35.1–46.3)
EGFRCR SERPLBLD CKD-EPI 2021: 108 ML/MIN/1.73M2 (ref 60–?)
EOSINOPHIL # BLD AUTO: 0 X10(3) UL (ref 0–0.7)
EOSINOPHIL NFR BLD AUTO: 0 %
ERYTHROCYTE [DISTWIDTH] IN BLOOD BY AUTOMATED COUNT: 12.5 % (ref 11–15)
GLUCOSE BLD-MCNC: 112 MG/DL (ref 70–99)
HCT VFR BLD AUTO: 39.7 %
HGB BLD-MCNC: 13.5 G/DL
IMM GRANULOCYTES # BLD AUTO: 0.04 X10(3) UL (ref 0–1)
IMM GRANULOCYTES NFR BLD: 0.4 %
LYMPHOCYTES # BLD AUTO: 0.73 X10(3) UL (ref 1–4)
LYMPHOCYTES NFR BLD AUTO: 7.1 %
MAGNESIUM SERPL-MCNC: 2.1 MG/DL (ref 1.6–2.6)
MCH RBC QN AUTO: 31.2 PG (ref 26–34)
MCHC RBC AUTO-ENTMCNC: 34 G/DL (ref 31–37)
MCV RBC AUTO: 91.7 FL
MONOCYTES # BLD AUTO: 0.5 X10(3) UL (ref 0.1–1)
MONOCYTES NFR BLD AUTO: 4.8 %
NEUTROPHILS # BLD AUTO: 9.04 X10 (3) UL (ref 1.5–7.7)
NEUTROPHILS # BLD AUTO: 9.04 X10(3) UL (ref 1.5–7.7)
NEUTROPHILS NFR BLD AUTO: 87.6 %
OSMOLALITY SERPL CALC.SUM OF ELEC: 287 MOSM/KG (ref 275–295)
PLATELET # BLD AUTO: 255 10(3)UL (ref 150–450)
POTASSIUM SERPL-SCNC: 3.9 MMOL/L (ref 3.5–5.1)
RBC # BLD AUTO: 4.33 X10(6)UL
SODIUM SERPL-SCNC: 139 MMOL/L (ref 136–145)
WBC # BLD AUTO: 10.3 X10(3) UL (ref 4–11)

## 2025-04-06 PROCEDURE — 99497 ADVNCD CARE PLAN 30 MIN: CPT | Performed by: INTERNAL MEDICINE

## 2025-04-06 PROCEDURE — 99233 SBSQ HOSP IP/OBS HIGH 50: CPT | Performed by: INTERNAL MEDICINE

## 2025-04-06 PROCEDURE — 99223 1ST HOSP IP/OBS HIGH 75: CPT | Performed by: INTERNAL MEDICINE

## 2025-04-06 RX ORDER — METHYLPREDNISOLONE SODIUM SUCCINATE 40 MG/ML
40 INJECTION INTRAMUSCULAR; INTRAVENOUS EVERY 12 HOURS
Status: DISCONTINUED | OUTPATIENT
Start: 2025-04-06 | End: 2025-04-07

## 2025-04-06 RX ORDER — ACETAMINOPHEN 325 MG/1
650 TABLET ORAL EVERY 6 HOURS PRN
Status: DISCONTINUED | OUTPATIENT
Start: 2025-04-06 | End: 2025-04-07

## 2025-04-06 NOTE — H&P
[facility]  part of [facility]    History and Physical     Kira Don Patient Status:  Emergency    2005 MRN [id number]   Location [facility] EMERGENCY DEPARTMENT Attending Zak Bey MD   Hosp Day # 0 PCP Fady Eldridge MD       History of Present Illness: Kira Don is a 19 year old female with no significant PMHx presented to the ER with complaints of cough, chest pain, sore throat, difficulty breathing and wheezing for the past 3-4 days. The patient subsequently developed chest pain which brought her to the hospital.   She denied any recent illness, sick contacts or any other complaints.   She was diagnosed with strep pharyngitis at immediate care earlier today and was referred to the ER.   The patient vapes but denies smoking or using any other substances.     Past Medical History:History reviewed. No pertinent past medical history.     Past Surgical History: History reviewed. No pertinent surgical history.    Social History:  reports that she has been smoking cigarettes. She has never used smokeless tobacco. She reports that she does not currently use alcohol. She reports that she does not currently use drugs.    Family History:   Family History   Problem Relation Age of Onset    Lipids Father     Lipids Paternal Grandmother     Lipids Paternal Grandfather     Heart Disorder Paternal Grandfather         CAD    Cancer Neg     Diabetes Neg     Hypertension Neg     Thyroid disease Neg        Allergies: Allergies[1]    Medications:  Medications Ordered Prior to Encounter[2]    Review of Systems:   A comprehensive 14 point review of systems was completed.    Pertinent positives and negatives noted in the HPI.    Physical Exam:    /68   Pulse 66   Temp 99.8 °F (37.7 °C) (Oral)   Resp 20   Ht 5' 3\" (1.6 m)   Wt 132 lb (59.9 kg)   SpO2 100%   BMI 23.38 kg/m²   General: No acute distress. Alert and oriented x 3.  Respiratory: Clear  to auscultation bilaterally. No wheezes. No rhonchi.  Cardiovascular: S1, S2. Regular rate and rhythm. No murmurs, no rubs or gallops. Equal pulses.   Abdomen: Soft, nontender, nondistended.  Positive bowel sounds. No rebound, guarding or organomegaly.  Neurologic: No focal neurological deficits. CNII-XII grossly intact.  Extremities: No edema or cyanosis.      Diagnostic Data:      Labs:  Recent Labs   Lab 04/05/25  1733   WBC 14.0*   HGB 13.5   MCV 91.7   .0       Recent Labs   Lab 04/05/25  1733   *   BUN 7*   CREATSERUM 0.83   CA 9.1      K 3.5      CO2 24.0       Estimated Creatinine Clearance: 89.4 mL/min (based on SCr of 0.83 mg/dL).    No results for input(s): \"PTP\", \"INR\" in the last 168 hours.    No results for input(s): \"TROP\", \"CK\" in the last 168 hours.    Imaging: Imaging data reviewed in Epic.      ASSESSMENT / PLAN:     Pneumomediastinum likely 2/2 vaping  Imaging reviewed  Saturating well on room air  Pulm on consult  Continue IV abx, steroids  Continue supplemental O2  Monitor vitals  Strep pharyngitis  Rapid strep A positive  Started on PO Amoxicillin 500 mg PO BID x10 days      Quality:  DVT Prophylaxis: Heparin     Plan of care discussed with ED physician    So Galdamez MD  4/5/2025                         The 21st Century Cures Act makes medical notes like these available to patients in the interest of transparency. Please be advised this is a medical document. Medical documents are intended to carry relevant information, facts as evident, and the clinical opinion of the practitioner. The medical note is intended as peer to peer communication and may appear blunt or direct. It is written in medical language and may contain abbreviations or verbiage that are unfamiliar.          [1] No Known Allergies  [2]   No current facility-administered medications on file prior to encounter.     No current outpatient medications on file prior to encounter.

## 2025-04-06 NOTE — CONSULTS
[facility]  part of [facility]    Report of Consultation    Kira Don Patient Status:  Inpatient    2005 MRN [id number]   Location [facility]5W Attending So Galdamez MD   Hosp Day # 1 PCP Fady Eldridge MD     Date of Admission:  2025  Date of Consult: 2025    Reason for Consultation:   Consults  Pneumomediastinum  Wheezing and bronchitis  Strep pharyngitis    History provided by:patient  HPI:     Chief Complaint   Patient presents with    Shortness Of Breath     HPI    19-year-old female with extensive history of cigarette vaping on a daily basis who started to have cough and wheezes few days ago with sore throat and yesterday had chest discomfort and more dyspnea and presented to ER and she had significant wheezing with dry cough and tested positive for strep throat and also chest x-ray showed small area of pneumomediastinum  Denied abdominal pain or vomiting or diarrhea  No lower extremity edema or pain or calf tenderness  No high fever or chills      History   History reviewed. No pertinent past medical history.  History reviewed. No pertinent surgical history.  Family History   Problem Relation Age of Onset    Lipids Father     Lipids Paternal Grandmother     Lipids Paternal Grandfather     Heart Disorder Paternal Grandfather         CAD    Cancer Neg     Diabetes Neg     Hypertension Neg     Thyroid disease Neg      Social History:  Social History     Socioeconomic History    Marital status: Single   Tobacco Use    Smoking status: Some Days     Types: Cigarettes    Smokeless tobacco: Never   Vaping Use    Vaping status: Every Day   Substance and Sexual Activity    Alcohol use: Not Currently    Drug use: Not Currently   Other Topics Concern    Second-hand smoke exposure No    Alcohol/drug concerns No    Violence concerns No     Social Drivers of Health     Food Insecurity: No Food Insecurity (2025)    NCSS - Food Insecurity     Worried About  Running Out of Food in the Last Year: No     Ran Out of Food in the Last Year: No   Transportation Needs: No Transportation Needs (4/5/2025)    NCSS - Transportation     Lack of Transportation: No   Housing Stability: Not At Risk (4/5/2025)    NCSS - Housing/Utilities     Has Housing: Yes     Worried About Losing Housing: No     Unable to Get Utilities: No     Allergies/Medications:   Allergies: Allergies[1]  No medications prior to admission.       Review of Systems:     Constitutional:  Negative for fever.   HENT:  Positive for congestion and sore throat.    Respiratory:  Positive for cough, shortness of breath and wheezing.    Cardiovascular: Negative.    Gastrointestinal: Negative.    Neurological: Negative.    Hematological: Negative.    Psychiatric/Behavioral: Negative.         Physical Exam:   Vital Signs:   height is 5' 3\" (1.6 m) and weight is 125 lb 4.8 oz (56.8 kg). Her oral temperature is 98.7 °F (37.1 °C). Her blood pressure is 105/67 and her pulse is 54. Her respiration is 18 and oxygen saturation is 95%.   Physical Exam  Constitutional:       General: She is not in acute distress.     Appearance: Normal appearance.   HENT:      Head: Atraumatic.      Nose: Nose normal.      Mouth/Throat:      Mouth: Mucous membranes are moist.   Eyes:      General: No scleral icterus.     Pupils: Pupils are equal, round, and reactive to light.   Cardiovascular:      Rate and Rhythm: Normal rate.      Heart sounds: No murmur heard.     No gallop.   Pulmonary:      Effort: No respiratory distress.      Breath sounds: No stridor. Wheezing present. No rhonchi or rales.      Comments: Symmetrical expansion of chest wall  No evidence of subcutaneous crepitation or emphysema  Good air exchange to both lungs  Bilateral expiratory wheezes  No rales or rhonchi  Chest:      Chest wall: No tenderness.   Abdominal:      General: Abdomen is flat. Bowel sounds are normal. There is no distension.      Palpations: Abdomen is soft.       Tenderness: There is no guarding.   Musculoskeletal:      Cervical back: Neck supple. No rigidity.      Right lower leg: No edema.      Left lower leg: No edema.   Lymphadenopathy:      Cervical: No cervical adenopathy.   Skin:     General: Skin is dry.         Results:     Lab Results   Component Value Date    WBC 10.3 04/06/2025    HGB 13.5 04/06/2025    HCT 39.7 04/06/2025    .0 04/06/2025    CREATSERUM 0.80 04/06/2025    BUN 9 04/06/2025     04/06/2025    K 3.9 04/06/2025     04/06/2025    CO2 27.0 04/06/2025     (H) 04/06/2025    CA 9.3 04/06/2025    ALB 4.7 02/04/2025    ALKPHO 64 02/04/2025    BILT 0.9 02/04/2025    TP 7.4 02/04/2025    AST 16 02/04/2025    ALT 11 02/04/2025    TSH 0.63 02/04/2025    MG 2.1 04/06/2025     XR CHEST AP PORTABLE  (CPT=71045)    Result Date: 4/5/2025  CONCLUSION:  1. Pneumomediastinum extending into the neck soft tissues.  Questionable trace right apical pneumothorax.  Findings called to Dr. Bey.    Dictated by (CST): Mitesh Hubbard MD on 4/05/2025 at 5:08 PM     Finalized by (CST): Mitesh Hubbard MD on 4/05/2025 at 5:11 PM               Impression:     1-pneumomediastinum secondary to significant cough and wheezes  2-bronchial spasm and wheezes secondary to heavy vaping  3-strep pharyngitis      Plan :  Antibiotics with Zosyn  Bronchodilator and taper steroid  Avoid heavy lifting or strenuous activity  Absolutely no more vaping  O2 therapy  Follow-up clinically in the radiologically    D/w pt and mother at bedside             Tri Menard MD  4/6/2025         [1] No Known Allergies

## 2025-04-06 NOTE — PROGRESS NOTES
[facility]  [facility]     Kira Don Patient Status:  Inpatient    2005 MRN [id number]   Location [facility]5W Attending So Galdamez MD   Hosp Day # 1 PCP Fady Eldridge MD     Subjective:     Patient resting comfortably and in NAD.   No overnight events reported by the nursing staff.   All questions and concerns addressed.       Objective:    Review of Systems:   ROS completed; pertinent positive and negatives stated in subjective.      Vital signs:  Temp:  [98.1 °F (36.7 °C)-99.8 °F (37.7 °C)] 98.7 °F (37.1 °C)  Pulse:  [54-79] 54  Resp:  [16-22] 18  BP: (105-132)/(57-86) 105/67  SpO2:  [91 %-100 %] 95 %      Physical Exam:    Gen: NAD AO x3  Chest: good air entry, mild bilateral wheezin  CVS: normal s1 and s2 RR  Abd: NABS soft NT ND  Neuro: CN 2-12 grossly intact  Ext: no edema in bilateral LE      Diagnostic Data:    Labs:  Recent Labs   Lab 25  1733 25  0611   WBC 14.0* 10.3   HGB 13.5 13.5   MCV 91.7 91.7   .0 255.0       Recent Labs   Lab 25  1733 25  0611   * 112*   BUN 7* 9   CREATSERUM 0.83 0.80   CA 9.1 9.3    139   K 3.5 3.9    103   CO2 24.0 27.0       Estimated Creatinine Clearance: 92.8 mL/min (based on SCr of 0.8 mg/dL).    No results for input(s): \"PTP\", \"INR\" in the last 168 hours.           Imaging: Imaging data reviewed in Epic.    Medications:    methylPREDNISolone  40 mg Intravenous Q12H    piperacillin-tazobactam  3.375 g Intravenous Q8H    heparin  5,000 Units Subcutaneous Q8H [facility]       Assessment & Plan:     Pneumomediastinum likely 2/2 vaping  Imaging reviewed  Saturating well on room air  Pulm on consult  Continue IV abx, steroids  Continue supplemental O2  Monitor vitals  Strep pharyngitis  Rapid strep A positive  Continue abx, complete 10 day course of treatment  Advance care planning  Full code  ~31 minutes       Plan of care discussed with  patient or family at bedside.      Supplementary Documentation:     Quality:  DVT Prophylaxis: Heparin s/c  CODE status: Full       Estimated date of discharge: TBD  Discharge is dependent on: clinical stability  At this point Ms. Don is expected to be discharge to: home           So Galdamez MD  Hospitalist    MDM: High, I personally reviewed the available laboratories, imaging. I discussed the case with RN. I ordered laboratories, studies including AM labs.  Medical decision making high, risk is high.       The 21st Century Cures Act makes medical notes like these available to patients in the interest of transparency. Please be advised this is a medical document. Medical documents are intended to carry relevant information, facts as evident, and the clinical opinion of the practitioner. The medical note is intended as peer to peer communication and may appear blunt or direct. It is written in medical language and may contain abbreviations or verbiage that are unfamiliar.

## 2025-04-06 NOTE — PLAN OF CARE
Pt alert and oriented. On room air. Pt ambulated within the room and up to the bathroom independently. Pt and family updated on plan of care. Frequent rounding by staff, call light within reach.   Problem: Patient Centered Care  Goal: Patient preferences are identified and integrated in the patient's plan of care  Description: Interventions:- What would you like us to know as we care for you? From home with family- Provide timely, complete, and accurate information to patient/family- Incorporate patient and family knowledge, values, beliefs, and cultural backgrounds into the planning and delivery of care- Encourage patient/family to participate in care and decision-making at the level they choose- Honor patient and family perspectives and choices  Outcome: Progressing     Problem: Patient/Family Goals  Goal: Patient/Family Long Term Goal  Description: Patient's Long Term Goal:   Interventions:-   - Monitor vitals  - Monitor appropriate labs  - Administer medications as ordered  - Follow MD's orders  - Update patient on plan of care   - Discharge planning   - See additional Care Plan goals for specific interventions  Outcome: Progressing  Goal: Patient/Family Short Term Goal  Description: Patient's Short Term Goal:   Interventions   - Monitor vitals  - Monitor appropriate labs  - Administer medications as ordered  - Follow MD's orders  - Update patient on plan of care   - Discharge planning   - See additional Care Plan goals for specific interventions  Outcome: Progressing     Problem: RESPIRATORY - ADULT  Goal: Achieves optimal ventilation and oxygenation  Description: INTERVENTIONS:- Assess for changes in respiratory status- Assess for changes in mentation and behavior- Position to facilitate oxygenation and minimize respiratory effort- Oxygen supplementation based on oxygen saturation or ABGs- Provide Smoking Cessation handout, if applicable- Encourage broncho-pulmonary hygiene including cough, deep breathe,  Incentive Spirometry- Assess the need for suctioning and perform as needed- Assess and instruct to report SOB or any respiratory difficulty- Respiratory Therapy support as indicated- Manage/alleviate anxiety- Monitor for signs/symptoms of CO2 retention  Outcome: Progressing      Yes

## 2025-04-06 NOTE — PLAN OF CARE
Problem: Patient Centered Care  Goal: Patient preferences are identified and integrated in the patient's plan of care  Description: Interventions:- What would you like us to know as we care for you? From home with family- Provide timely, complete, and accurate information to patient/family- Incorporate patient and family knowledge, values, beliefs, and cultural backgrounds into the planning and delivery of care- Encourage patient/family to participate in care and decision-making at the level they choose- Honor patient and family perspectives and choices  Outcome: Progressing     Problem: Patient/Family Goals  Goal: Patient/Family Long Term Goal  Description: Patient's Long Term Goal:   Interventions:-   - Monitor vitals  - Monitor appropriate labs  - Administer medications as ordered  - Follow MD's orders  - Update patient on plan of care   - Discharge planning   - See additional Care Plan goals for specific interventions  Outcome: Progressing  Goal: Patient/Family Short Term Goal  Description: Patient's Short Term Goal:   Interventions   - Monitor vitals  - Monitor appropriate labs  - Administer medications as ordered  - Follow MD's orders  - Update patient on plan of care   - Discharge planning   - See additional Care Plan goals for specific interventions  Outcome: Progressing     Problem: RESPIRATORY - ADULT  Goal: Achieves optimal ventilation and oxygenation  Description: INTERVENTIONS:- Assess for changes in respiratory status- Assess for changes in mentation and behavior- Position to facilitate oxygenation and minimize respiratory effort- Oxygen supplementation based on oxygen saturation or ABGs- Provide Smoking Cessation handout, if applicable- Encourage broncho-pulmonary hygiene including cough, deep breathe, Incentive Spirometry- Assess the need for suctioning and perform as needed- Assess and instruct to report SOB or any respiratory difficulty- Respiratory Therapy support as indicated- Manage/alleviate  anxiety- Monitor for signs/symptoms of CO2 retention  Outcome: Progressing

## 2025-04-07 ENCOUNTER — APPOINTMENT (OUTPATIENT)
Dept: GENERAL RADIOLOGY | Facility: HOSPITAL | Age: 20
End: 2025-04-07
Attending: INTERNAL MEDICINE
Payer: COMMERCIAL

## 2025-04-07 VITALS
HEIGHT: 63 IN | OXYGEN SATURATION: 97 % | SYSTOLIC BLOOD PRESSURE: 113 MMHG | TEMPERATURE: 98 F | HEART RATE: 82 BPM | BODY MASS INDEX: 22.2 KG/M2 | RESPIRATION RATE: 18 BRPM | WEIGHT: 125.31 LBS | DIASTOLIC BLOOD PRESSURE: 68 MMHG

## 2025-04-07 LAB
ALBUMIN SERPL-MCNC: 4.6 G/DL (ref 3.2–4.8)
ALBUMIN/GLOB SERPL: 2.1 {RATIO} (ref 1–2)
ALP LIVER SERPL-CCNC: 60 U/L
ALT SERPL-CCNC: 7 U/L
ANION GAP SERPL CALC-SCNC: 7 MMOL/L (ref 0–18)
AST SERPL-CCNC: 15 U/L (ref ?–34)
BASOPHILS # BLD AUTO: 0.02 X10(3) UL (ref 0–0.2)
BASOPHILS NFR BLD AUTO: 0.1 %
BILIRUB SERPL-MCNC: 0.6 MG/DL (ref 0.3–1.2)
BUN BLD-MCNC: 8 MG/DL (ref 9–23)
BUN/CREAT SERPL: 10.5 (ref 10–20)
CALCIUM BLD-MCNC: 9.4 MG/DL (ref 8.7–10.4)
CHLORIDE SERPL-SCNC: 105 MMOL/L (ref 98–112)
CO2 SERPL-SCNC: 27 MMOL/L (ref 21–32)
CREAT BLD-MCNC: 0.76 MG/DL
DEPRECATED RDW RBC AUTO: 43.5 FL (ref 35.1–46.3)
EGFRCR SERPLBLD CKD-EPI 2021: 115 ML/MIN/1.73M2 (ref 60–?)
EOSINOPHIL # BLD AUTO: 0.01 X10(3) UL (ref 0–0.7)
EOSINOPHIL NFR BLD AUTO: 0.1 %
ERYTHROCYTE [DISTWIDTH] IN BLOOD BY AUTOMATED COUNT: 12.8 % (ref 11–15)
GLOBULIN PLAS-MCNC: 2.2 G/DL (ref 2–3.5)
GLUCOSE BLD-MCNC: 109 MG/DL (ref 70–99)
HCT VFR BLD AUTO: 38.7 %
HGB BLD-MCNC: 13.1 G/DL
IMM GRANULOCYTES # BLD AUTO: 0.13 X10(3) UL (ref 0–1)
IMM GRANULOCYTES NFR BLD: 0.7 %
LYMPHOCYTES # BLD AUTO: 1.62 X10(3) UL (ref 1–4)
LYMPHOCYTES NFR BLD AUTO: 8.4 %
MCH RBC QN AUTO: 31.3 PG (ref 26–34)
MCHC RBC AUTO-ENTMCNC: 33.9 G/DL (ref 31–37)
MCV RBC AUTO: 92.6 FL
MONOCYTES # BLD AUTO: 1.23 X10(3) UL (ref 0.1–1)
MONOCYTES NFR BLD AUTO: 6.4 %
NEUTROPHILS # BLD AUTO: 16.34 X10 (3) UL (ref 1.5–7.7)
NEUTROPHILS # BLD AUTO: 16.34 X10(3) UL (ref 1.5–7.7)
NEUTROPHILS NFR BLD AUTO: 84.3 %
OSMOLALITY SERPL CALC.SUM OF ELEC: 287 MOSM/KG (ref 275–295)
PLATELET # BLD AUTO: 260 10(3)UL (ref 150–450)
POTASSIUM SERPL-SCNC: 4.4 MMOL/L (ref 3.5–5.1)
PROT SERPL-MCNC: 6.8 G/DL (ref 5.7–8.2)
RBC # BLD AUTO: 4.18 X10(6)UL
SODIUM SERPL-SCNC: 139 MMOL/L (ref 136–145)
WBC # BLD AUTO: 19.4 X10(3) UL (ref 4–11)

## 2025-04-07 PROCEDURE — 99232 SBSQ HOSP IP/OBS MODERATE 35: CPT | Performed by: INTERNAL MEDICINE

## 2025-04-07 PROCEDURE — 99239 HOSP IP/OBS DSCHRG MGMT >30: CPT | Performed by: INTERNAL MEDICINE

## 2025-04-07 PROCEDURE — 71046 X-RAY EXAM CHEST 2 VIEWS: CPT | Performed by: INTERNAL MEDICINE

## 2025-04-07 RX ORDER — PREDNISONE 10 MG/1
TABLET ORAL
Qty: 18 TABLET | Refills: 0 | Status: SHIPPED | OUTPATIENT
Start: 2025-04-07

## 2025-04-07 RX ORDER — BENZONATATE 200 MG/1
200 CAPSULE ORAL 3 TIMES DAILY PRN
Qty: 30 CAPSULE | Refills: 0 | Status: SHIPPED | OUTPATIENT
Start: 2025-04-07

## 2025-04-07 NOTE — PROGRESS NOTES
[facility]  part of [facility]ist Progress Note     Kira Don Patient Status:  Inpatient    2005 MRN [id number]   Location [facility]5W Attending oS Galdamez MD   Hosp Day # 2 PCP Fady Eldridge MD     Subjective:     Patient resting comfortably and in NAD.   No overnight events reported by the nursing staff.   All questions and concerns addressed.   Looking forward to discharging soon.       Objective:    Review of Systems:   ROS completed; pertinent positive and negatives stated in subjective.      Vital signs:  Temp:  [98 °F (36.7 °C)-98.9 °F (37.2 °C)] 98 °F (36.7 °C)  Pulse:  [43-79] 60  Resp:  [16-18] 16  BP: ()/(56-65) 105/63  SpO2:  [92 %-97 %] 97 %      Physical Exam:    Gen: NAD AO x3  Chest: good air entry, mild bilateral wheezing - mild improvement  CVS: normal s1 and s2 RR  Abd: NABS soft NT ND  Neuro: CN 2-12 grossly intact  Ext: no edema in bilateral LE      Diagnostic Data:    Labs:  Recent Labs   Lab 25  1733 25  0611 25  0531   WBC 14.0* 10.3 19.4*   HGB 13.5 13.5 13.1   MCV 91.7 91.7 92.6   .0 255.0 260.0       Recent Labs   Lab 25  1733 25  0611 25  0531   * 112* 109*   BUN 7* 9 8*   CREATSERUM 0.83 0.80 0.76   CA 9.1 9.3 9.4   ALB  --   --  4.6    139 139   K 3.5 3.9 4.4    103 105   CO2 24.0 27.0 27.0   ALKPHO  --   --  60   AST  --   --  15   ALT  --   --  7*   BILT  --   --  0.6   TP  --   --  6.8       Estimated Creatinine Clearance: 97.7 mL/min (based on SCr of 0.76 mg/dL).    No results for input(s): \"PTP\", \"INR\" in the last 168 hours.           Imaging: Imaging data reviewed in Epic.    Medications:    methylPREDNISolone  40 mg Intravenous Q12H    piperacillin-tazobactam  3.375 g Intravenous Q8H    heparin  5,000 Units Subcutaneous Q8H [facility]       Assessment & Plan:     Pneumomediastinum likely 2/2 vaping  Imaging reviewed  Saturating well on room air but  remains wheezy  Pulm on consult  Continue IV abx, steroids  Continue supplemental O2  Repeat CXR pending at this time  Monitor vitals  Strep pharyngitis  Rapid strep A positive  Continue abx, complete 10 day course of treatment  Vaping  Counseled on cessation  Advance care planning  Full code  ~31 minutes       Plan of care discussed with patient or family at bedside.      Supplementary Documentation:     Quality:  DVT Prophylaxis: Heparin s/c  CODE status: Full       Estimated date of discharge: TBD  Discharge is dependent on: clinical stability  At this point Ms. Don is expected to be discharge to: home           So Galdamez MD  Hospitalist    MDM: High, I personally reviewed the available laboratories, imaging. I discussed the case with RN. I ordered laboratories, studies including AM labs.  Medical decision making high, risk is high.       The 21st Century Cures Act makes medical notes like these available to patients in the interest of transparency. Please be advised this is a medical document. Medical documents are intended to carry relevant information, facts as evident, and the clinical opinion of the practitioner. The medical note is intended as peer to peer communication and may appear blunt or direct. It is written in medical language and may contain abbreviations or verbiage that are unfamiliar.

## 2025-04-07 NOTE — DISCHARGE SUMMARY
[facility]  part of [facility]    DISCHARGE SUMMARY     Kira Don Patient Status:  Inpatient    2005 MRN [id number]   Location [facility] Attending So Galdamez MD   Hosp Day # 2 PCP Fady Eldridge MD     Date of Admission: 2025  Date of Discharge:  2025    Discharge Disposition: [facility] Emergency Room    Discharge Diagnosis:     Pneumomediastinum  Strep pharyngitis  Vaping     History of Present Illness:     Kira Don is a 19 year old female with no significant PMHx presented to the ER with complaints of cough, chest pain, sore throat, difficulty breathing and wheezing for the past 3-4 days. The patient subsequently developed chest pain which brought her to the hospital.   She denied any recent illness, sick contacts or any other complaints.   She was diagnosed with strep pharyngitis at immediate care earlier today and was referred to the ER.   The patient vapes but denies smoking or using any other substances.     Brief Synopsis:     Pneumomediastinum likely 2/2 vaping  Imaging reviewed  Saturating well on room air but remains wheezy  Pulm on consult  Continue IV abx, steroids  Continue supplemental O2  Repeat CXR reviewed  Discharge home on PO Augmentin and Prednisone  Strep pharyngitis  Rapid strep A positive  Continue abx, complete 10 day course of treatment  Vaping  Counseled on cessation  Patient is to follow up with PCP and Pulm as opt. Discharge meds ordered per pulm recs  Patient is to remain compliant with all discharge medications and instructions and to follow up as advised.   Patient encouraged to make healthy lifestyle and dietary changes.    Lace+ Score: 22  59-90 High Risk  29-58 Medium Risk  0-28   Low Risk       TCM Follow-Up Recommendation:  LACE < 29: Low Risk of readmission after discharge from the hospital; Still recommend for TCM follow-up.      Procedures during hospitalization:   None    Incidental or significant  findings and recommendations (brief descriptions):  None    Lab/Test results pending at Discharge:   None    Consultants:  Consultants         Provider   Role Specialty     Tri Menard MD      Consulting Physician PULMONARY DISEASES            Discharge Medication List:     Discharge Medications        START taking these medications        Instructions Prescription details   amoxicillin clavulanate 875-125 MG Tabs  Commonly known as: Augmentin      Take 1 tablet by mouth 2 (two) times daily for 10 days.   Stop taking on: April 17, 2025  Quantity: 14 tablet  Refills: 0     benzonatate 200 MG Caps  Commonly known as: Tessalon      Take 1 capsule (200 mg total) by mouth 3 (three) times daily as needed for cough.   Quantity: 30 capsule  Refills: 0     predniSONE 10 MG Tabs  Commonly known as: Deltasone      30 mg daily for 3 days, 20 mg daily for 3 days, 10 mg daily for 3 days   Quantity: 18 tablet  Refills: 0               Where to Get Your Medications        These medications were sent to [id number] - [facility] [address] [facility], [phone], [phone]  [address], [place] [address]      Phone: [phone]   amoxicillin clavulanate 875-125 MG Tabs  benzonatate 200 MG Caps  predniSONE 10 MG Tabs         [place]    Follow-up appointment:   Fady Eldridge MD  [address]  [facility] [place] [facility] [address] [phone]    Follow up in 1 week(s)      Tri Menard MD  [address]  [facility] [address] [phone]    Follow up in 1 week(s)      Appointments for Next 30 Days 4/7/2025 - 5/7/2025        Date Arrival Time Visit Type Length Department Provider     4/7/2025  6:30 PM  MYCHART EXAM SPECIALTY [7518] 15 min [facility] ([place], [place] - Midwifery Celeste Iniguez CNM    Patient Instructions:     Please arrive 15 minutes prior to your scheduled appointment. Please also bring your  Insurance card, Photo ID, and your medication bottles or a list of your current medication.    If your condition improves and this appointment is no longer needed, please contact your physician office to cancel.    Please verify with your primary care provider if your insurance requires a referral.        Location Instructions:     Your appointment is located at [address] in [place], [place].&nbsp; Please park in the Yellow lot and enter through the [facility] entrance.&nbsp; Then proceed to [address]. Note: A $50 fee will be charged for missed appointments or same-day cancellations. Please provide 24 hours' notice if you need to cancel or reschedule your appointment.   Masks are optional for all patients and visitors, unless otherwise indicated.                      Vital signs:  Temp:  [97.5 °F (36.4 °C)-98.2 °F (36.8 °C)] 97.5 °F (36.4 °C)  Pulse:  [] 82  Resp:  [16-18] 18  BP: ()/(56-68) 113/68  SpO2:  [94 %-97 %] 97 %    Physical Exam:    Gen: NAD AO x3  Chest: good air entry, mild wheezing  CVS: normal s1 and s2 RR  Abd: NABS soft NT ND  Neuro: CN 2-12 grossly intact  Ext: no edema in bilateral LE    -----------------------------------------------------------------------------------------------  PATIENT DISCHARGE INSTRUCTIONS: See electronic chart    So Galdamez MD  Hospitalist    Time spent:  > 30 minutes    The 21st Century Cures Act makes medical notes like these available to patients in the interest of transparency. Please be advised this is a medical document. Medical documents are intended to carry relevant information, facts as evident, and the clinical opinion of the practitioner. The medical note is intended as peer to peer communication and may appear blunt or direct. It is written in medical language and may contain abbreviations or verbiage that are unfamiliar.

## 2025-04-07 NOTE — DISCHARGE INSTRUCTIONS
Absolutely no vaping or smoking  Avoid heavy lifting or strenuous activity  No flying or diving for 3 months  No scuba diving lifelong  If worse to come back to ER

## 2025-04-07 NOTE — CM/SW NOTE
04/07/25 1400   Discharge disposition   Expected discharge disposition Home or Self   Discharge transportation Private car     Tereza Waters MBA BSN RN CRRN   RN Case Manager  [phone]

## 2025-04-07 NOTE — PROGRESS NOTES
Patient Name: Kira Don      To Whom it may concern:    Kira Don was admitted in hospital from 4/5/2025  3:28 PM to 4/7/2025 for treatment of a medical condition. Please excuse Kira Don from attending work from 4/5/2025  3:28 PM through 4/14/2025. she should avoid heavy lifting until cleared for by Pulmonology and/or PCP. The patient may return to work thereafter.    If any future correspondence is necessary, please communicate with patient's regular primary care physician.      Thank you,        So Galdamez MD  Hospitalist / Internal Medicine  4/7/2025  [phone]

## 2025-04-07 NOTE — DIETARY NOTE
Brief Nutrition Note:    Pt admitted for pneumomediastinum. Pt screened at nutrition risk at admission by RN for decreased appetite and unintentional weight loss. Chart reviewed, pt admitted with c/o cough, chest congestion, SOB and wheezing. Chest x-ray showed small area of pneumomediastinum. Discussion with RN, no concerns. Intakes reviewed, % x 2 meals. Pt visited, family at bedside. Pt reports no changes in intake or appetite. Pt reports good intake/appetite prior to admission (PTA). Pt unsure of weight loss, reports usual body weight (UBW) about 131#. Current weight 125# 4.8 oz (Bed-scale), also noted 132# on same day. EMR weight review, last known weight # on 2/17/25. Per EMR weight review, pt noted weighing 136# 10 oz on 2/4/25 and 143# on 6/24/24. Pt denies any changes in how clothes feel. Monitor weight. Pt appears well-nourished. Encouraged adequate energy and protein intake. Pt appears at no nutrition risk at this time. F/u at length of stay (LOS) per protocol. Please consult nutrition services if earlier intervention is indicated.    Wt Readings from Last 6 Encounters:   04/05/25 56.8 kg (125 lb 4.8 oz)   02/17/25 59.4 kg (131 lb)   02/04/25 62 kg (136 lb 9.6 oz)   06/24/24 64.9 kg (143 lb) (74%, Z= 0.63)*   01/22/24 70.3 kg (155 lb) (85%, Z= 1.05)*   08/21/23 69.4 kg (153 lb) (85%, Z= 1.03)*     * Growth percentiles are based on CDC (Girls, 2-20 Years) data.     Patient Weight(s) for the past 336 hrs:   Weight   04/05/25 1950 56.8 kg (125 lb 4.8 oz)   04/05/25 1503 59.9 kg (132 lb)     Percent Meals Eaten (last 6 days)       Date/Time Percent Meals Eaten (%)    04/06/25 0950 100 %     Percent Meals Eaten (%): meal from home at 04/06/25 0950    04/06/25 1200 0 %     Percent Meals Eaten (%): snacked at 04/06/25 1200    04/07/25 1055 75 %          Chloe Lee MS, KONG, RDN, LDN  Clinical Dietitian  P: [phone]

## 2025-04-07 NOTE — PLAN OF CARE
IV was removed at bedside. Discharge education completed and pt belongings were gathered.   Problem: Patient Centered Care  Goal: Patient preferences are identified and integrated in the patient's plan of care  Description: Interventions:- What would you like us to know as we care for you? From home with family- Provide timely, complete, and accurate information to patient/family- Incorporate patient and family knowledge, values, beliefs, and cultural backgrounds into the planning and delivery of care- Encourage patient/family to participate in care and decision-making at the level they choose- Honor patient and family perspectives and choices  Outcome: Progressing     Problem: Patient/Family Goals  Goal: Patient/Family Long Term Goal  Description: Patient's Long Term Goal:   Interventions:-   - Monitor vitals  - Monitor appropriate labs  - Administer medications as ordered  - Follow MD's orders  - Update patient on plan of care   - Discharge planning   - See additional Care Plan goals for specific interventions  Outcome: Progressing  Goal: Patient/Family Short Term Goal  Description: Patient's Short Term Goal:   Interventions   - Monitor vitals  - Monitor appropriate labs  - Administer medications as ordered  - Follow MD's orders  - Update patient on plan of care   - Discharge planning   - See additional Care Plan goals for specific interventions  Outcome: Progressing     Problem: RESPIRATORY - ADULT  Goal: Achieves optimal ventilation and oxygenation  Description: INTERVENTIONS:- Assess for changes in respiratory status- Assess for changes in mentation and behavior- Position to facilitate oxygenation and minimize respiratory effort- Oxygen supplementation based on oxygen saturation or ABGs- Provide Smoking Cessation handout, if applicable- Encourage broncho-pulmonary hygiene including cough, deep breathe, Incentive Spirometry- Assess the need for suctioning and perform as needed- Assess and instruct to report SOB  or any respiratory difficulty- Respiratory Therapy support as indicated- Manage/alleviate anxiety- Monitor for signs/symptoms of CO2 retention  Outcome: Progressing

## 2025-04-07 NOTE — PROGRESS NOTES
[facility]  part of [facility]    Progress Note    Kira Don Patient Status:  Inpatient    2005 MRN [id number]   Location [facility] Attending So Galdamez MD   Hosp Day # 2 PCP Fady Eldridge MD         Subjective:   Subjective:  Feeling much better overall  Comfortable on room air  Less cough and wheezes  No dyspnea  No chest pain  No fever  Objective:   Blood pressure 113/68, pulse 82, temperature 97.5 °F (36.4 °C), temperature source Axillary, resp. rate 18, height 5' 3\" (1.6 m), weight 125 lb 4.8 oz (56.8 kg), SpO2 97%.  Physical Exam  Constitutional:       General: She is not in acute distress.     Appearance: Normal appearance. She is not ill-appearing.   HENT:      Mouth/Throat:      Mouth: Mucous membranes are moist.   Eyes:      General: No scleral icterus.  Cardiovascular:      Rate and Rhythm: Normal rate.      Heart sounds:      No gallop.   Pulmonary:      Comments: Symmetrical expansion of chest wall  No significant subcutaneous crepitation  Good air exchange to both lungs  Minimal wheezes in the bases otherwise clear with no rales or rhonchi  Musculoskeletal:      Cervical back: No rigidity or tenderness.      Right lower leg: No edema.      Left lower leg: No edema.   Lymphadenopathy:      Cervical: No cervical adenopathy.   Neurological:      Mental Status: She is oriented to person, place, and time.         Results:   Lab Results   Component Value Date    WBC 19.4 (H) 2025    HGB 13.1 2025    HCT 38.7 2025    .0 2025    CREATSERUM 0.76 2025    BUN 8 (L) 2025     2025    K 4.4 2025     2025    CO2 27.0 2025     (H) 2025    CA 9.4 2025    ALB 4.6 2025    ALKPHO 60 2025    BILT 0.6 2025    TP 6.8 2025    AST 15 2025    ALT 7 (L) 2025    TSH 0.63 2025    MG 2.1 2025       XR CHEST PA + LAT CHEST  (CPT=71046)    Result Date: 4/7/2025  CONCLUSION:   Pneumomediastinum is similar to perhaps slightly improved in comparison to examination from 2 days prior.  Subcutaneous emphysema in the lower neck bilaterally has slightly worsened.  No definite pneumothorax identified.    Dictated by (CST): Ashutosh Hutton MD on 4/07/2025 at 1:28 PM     Finalized by (CST): Ashutosh Hutton MD on 4/07/2025 at 1:30 PM          XR CHEST AP PORTABLE  (CPT=71045)    Result Date: 4/5/2025  CONCLUSION:  1. Pneumomediastinum extending into the neck soft tissues.  Questionable trace right apical pneumothorax.  Findings called to Dr. Bey.    Dictated by (CST): Mitesh Hubbard MD on 4/05/2025 at 5:08 PM     Finalized by (CST): Mitesh Hubbard MD on 4/05/2025 at 5:11 PM               Assessment & Plan:      1-pneumomediastinum secondary to significant cough and wheezes  2-bronchial spasm and wheezes secondary to heavy vaping  3-strep pharyngitis        Received steroid and Zosyn   Clinically much better today  Chest x-ray stable    Plan ;  Discharge home on p.o. Augmentin 875 p.o. twice daily for 7 days  Taper course of prednisone 30 mg for 3 days then 20 mg for 3 days then 10 mg for 3 days then stop    Absolutely no vaping or smoking  Avoid heavy lifting or strenuous activity  No flying or diving for 3 months  No scuba diving lifelong  If worse to come back to ER  Follow-up with me in 1 to 2 weeks    Discussed with patient and family at length  Discussed with hospitalist on staff            Tri Menard MD  4/7/2025

## 2025-04-08 ENCOUNTER — PATIENT OUTREACH (OUTPATIENT)
Dept: CASE MANAGEMENT | Age: 20
End: 2025-04-08

## 2025-04-08 NOTE — PROGRESS NOTES
I attempted to reach the patient for TCM. The patient phone number on file rings but does not forward to a voicemail.

## 2025-04-08 NOTE — PAYOR COMM NOTE
--------------  ADMISSION REVIEW     Payor: [facility] CHOICE PLUS  Subscriber #:  [id number]  Authorization Number: [id number]    Admit date: 4/5/25  Admit time:  7:44 PM       ED Provider Notes        History   HPI  19-year-old female without significant past medical history presents with complaints of cough, chest pain, sore throat, wheezing, and dyspnea.  The patient reports symptoms of cough and wheezing over the past 2 to 3 days.  She reports today she began experiencing anterior chest pain.  She reports persistent pain worsened with coughing.  She initially presented to immediate care where she tested positive for strep pharyngitis.  She was hypoxic in the immediate care and was sent to the ED for further evaluation.  She denies history of any known lung issues.  No known fevers.    ED Triage Vitals [04/05/25 1503]   /86   Pulse 71   Resp 22   Temp 99.8 °F (37.7 °C)   Temp src Oral   SpO2 98 %   O2 Device None (Room air)   Eyes: pupils equal and round no pallor or injection  ENT, Mouth: mucous membranes moist.  Bilateral TMs and auditory canals normal-appearing.  Oropharynx with mild erythema.  No exudate noted.  Uvula midline.  No asymmetry noted.  Respiratory: there are no retractions, faint, scattered wheezes bilaterally  Cardiovascular: regular rate and rhythm  Gastrointestinal:  abdomen is soft and non tender, no masses, bowel sounds normal  Neurological: Speech normal.  Moving extremities x 4.  Skin: warm and dry, no rashes.  Musculoskeletal: neck is supple non tender        Extremities are symmetrical, full range of motion no leg edema or tenderness noted.  Psychiatric: patient is oriented X 3, there is no agitation    Labs Reviewed   CBC WITH DIFFERENTIAL WITH PLATELET - Abnormal; Notable for the following components:       Result Value    WBC 14.0 (*)     Neutrophil Absolute Prelim 12.31 (*)     Neutrophil Absolute 12.31 (*)     Lymphocyte Absolute 0.83 (*)    Admission  disposition: 4/5/2025  5:50 PM    Disposition and Plan     Clinical Impression:  1. Pneumomediastinum (HCC)       Disposition:  Admit  4/5/2025  5:50 pm        History and Physical   History of Present Illness: Kira Don is a 19 year old female with no significant PMHx   She denied any recent illness, sick contacts or any other complaints.   She was diagnosed with strep pharyngitis at immediate care earlier today and was referred to the ER.   The patient vapes but denies smoking or using any other substances.      Past Medical History:History reviewed. No pertinent past medical history.    Past Surgical History: History reviewed. No pertinent surgical history.     /68   Pulse 66   Temp 99.8 °F (37.7 °C) (Oral)   Resp 20   Ht 5' 3\" (1.6 m)   Wt 132 lb (59.9 kg)   SpO2 100%   BMI 23.38 kg/m²   General:Alert and oriented x 3.  Respiratory: Clear to auscultation bilaterally. No wheezes. No rhonchi.  Cardiovascular: S1, S2. Regular rate and rhythm. No murmurs, no rubs or gallops. Equal pulses.   Abdomen: Soft, nontender, nondistended.  Positive bowel sounds. No rebound, guarding or organomegaly.  Neurologic: No focal neurological deficits. CNII-XII grossly intact.  Extremities: No edema or cyanosis.     Lab 04/05/25  1733   WBC 14.0*   HGB 13.5   MCV 91.7   .0      Lab 04/05/25  1733   *   BUN 7*   CREATSERUM 0.83   CA 9.1      K 3.5      CO2 24.0      ASSESSMENT / PLAN:      Pneumomediastinum likely 2/2 vaping  Imaging reviewed  Saturating well on room air  Pulm on consult  Continue IV abx, steroids  Continue supplemental O2  Monitor vitals  Strep pharyngitis  Rapid strep A positive  Started on PO Amoxicillin 500 mg PO BID x10 days      Quality:  DVT Prophylaxis: Heparin                4/6/25     Temp:  [98.1 °F (36.7 °C)-99.8 °F (37.7 °C)] 98.7 °F (37.1 °C)  Pulse:  [54-79] 54  Resp:  [16-22] 18  BP: (105-132)/(57-86) 105/67  SpO2:  [91 %-100 %] 95 %     Physical  Exam:    Gen:  AO x3  Chest: good air entry, mild bilateral wheezin  CVS: normal s1 and s2 RR  Abd: NABS soft NT ND  Neuro: CN 2-12 grossly intact  Ext: no edema in bilateral LE     Lab 04/05/25  1733 04/06/25  0611   WBC 14.0* 10.3   HGB 13.5 13.5   MCV 91.7 91.7   .0 255.0      Lab 04/05/25  1733 04/06/25  0611   * 112*   BUN 7* 9   CREATSERUM 0.83 0.80   CA 9.1 9.3    139   K 3.5 3.9    103   CO2 24.0 27.0      Medications:    methylPREDNISolone  40 mg Intravenous Q12H    piperacillin-tazobactam  3.375 g Intravenous Q8H    heparin  5,000 Units Subcutaneous Q8H ADI       Assessment & Plan:      Pneumomediastinum likely 2/2 vaping  Imaging reviewed  Saturating well on room air  Pulm on consult  Continue IV abx, steroids  Continue supplemental O2  Monitor vitals  Strep pharyngitis  Rapid strep A positive  Continue abx, complete 10 day course of treatment                    PULMONOLOGY  Impression:      1-pneumomediastinum secondary to significant cough and wheezes  2-bronchial spasm and wheezes secondary to heavy vaping  3-strep pharyngitis        Plan :  Antibiotics with Zosyn  Bronchodilator and taper steroid  Avoid heavy lifting or strenuous activity  O2 therapy      DATE OF DISCHARGE: 4/7/25    DISCHARGE SUMMARY      Date of Admission: 4/5/2025  Date of Discharge:  4/7/2025     Discharge Disposition: [facility] Room     Discharge Diagnosis:    Pneumomediastinum  Strep pharyngitis     History of Present Illness:    Kira Don is a 19 year old female with no significant PMHx presented to the ER with complaints of cough, chest pain, sore throat, difficulty breathing and wheezing for the past 3-4 days. The patient subsequently developed chest pain which brought her to the hospital.   She denied any recent illness, sick contacts or any other complaints.   She was diagnosed with strep pharyngitis at immediate care earlier today and was referred to the ER.   The patient vapes  but denies smoking or using any other substances.      Brief Synopsis:      Pneumomediastinum likely 2/2 vaping  Imaging reviewed  Saturating well on room air but remains wheezy  Pulm on consult  Continue IV abx, steroids  Continue supplemental O2  Repeat CXR reviewed  Discharge home on PO Augmentin and Prednisone  Strep pharyngitis  Rapid strep A positive  Continue abx, complete 10 day course of treatment    Patient is to follow up with PCP and Pulm as opt. Discharge meds ordered per pulm recs  Patient is to remain compliant with all discharge medications and instructions and to follow up as advised.   Patient encouraged to make healthy lifestyle and dietary changes.      Lab/Test results pending at Discharge:   None           Vital signs:  Temp:  [97.5 °F (36.4 °C)-98.2 °F (36.8 °C)] 97.5 °F (36.4 °C)  Pulse:  [] 82  Resp:  [16-18] 18  BP: ()/(56-68) 113/68  SpO2:  [94 %-97 %] 97 %     Physical Exam:    Gen: NAD AO x3  Chest: good air entry, mild wheezing  CVS: normal s1 and s2 RR  Abd: NABS soft NT ND  Neuro: CN 2-12 grossly intact  Ext: no edema in bilateral LE                Vitals (last day) before discharge       Date/Time Temp Pulse Resp BP SpO2 Weight O2 Device O2 Flow Rate (L/min) [facility]    04/07/25 1139 97.5 °F (36.4 °C) 82 18 113/68 97 % -- None (Room air) -- AF    04/07/25 1007 -- 140 -- -- -- -- -- -- RW    04/07/25 0748 98 °F (36.7 °C) 60 16 105/63 97 % -- None (Room air) -- AF    04/07/25 0537 98.1 °F (36.7 °C) 57 18 95/56 96 % -- None (Room air) -- AG    04/07/25 0350 -- 43 -- -- -- -- -- -- CY    04/07/25 0100 -- 52 16 -- 94 % -- None (Room air) -- AG    04/06/25 2207 98.2 °F (36.8 °C) 70 16 116/63 96 % -- None (Room air) -- AG    04/06/25 1713 98.1 °F (36.7 °C) 79 18 109/63 96 % -- None (Room air) -- MG    04/06/25 1158 98.9 °F (37.2 °C) 65 18 117/65 92 % -- None (Room air) -- MG    04/06/25 0751 98.7 °F (37.1 °C) 54 18 105/67 95 % -- None (Room air) -- MG    04/06/25 0556 98.1 °F (36.7  °C) 63 16 111/57 94 % -- None (Room air) -- AG

## 2025-04-09 NOTE — PROGRESS NOTES
Transitional Care Management   Discharge Date: 25  Contact Date: 2025    Assessment:  (Insert appropriate Smartphrase below after completing flowsheet)  TCM Initial Assessment    General:  Assessment completed with: Patient  Patient Subjective: The patient reports that she is feeling much better.  Chief Complaint: Pneumomediastinum (HCC)  Verify patient name and  with patient/ caregiver: Yes    Hospital Stay/Discharge:  Tell me what you understand of why you were in the hospital or emergency department: Pneumonia  Prior to leaving the hospital were your Discharge Instructions reviewed with you?: Yes  What questions do you have about your Discharge Instructions?: None  Do you feel better or worse since you left the hospital or emergency department?: Better    Follow - Up Appointment:  Do you have a follow-up appointment?: No  Are there any barriers to getting to your follow-up appointment?: No    Home Health/DME:  Prior to leaving the hospital was Home Health (HH) arranged for you?: No     Prior to leaving the hospital or emergency department was Durable Medical Equipment (DME), medical supplies, or infusions arranged for you?: No  Are DME/medical supply/infusions needs identified by staff during this assessment?: No     Medications/Diet:       Were you given a different diet per your Discharge Instructions?: No     Questions/Concerns:  Do you have any questions or concerns that have not been discussed?: No        Follow-up Appointments:  Your appointments       Date & Time Appointment Department (Center)    2025 10:40 AM T Hospital Follow Up with Jenna Gonzalez MD [facility] [address], [place] ([facility] [place])              [facility] [address], [place] [facility] [place] [address] [place] [address]  [phone]            Transitional Care Clinic  Was TCC Ordered: No  Was TCC Scheduled: No   - If yes: []  Advised  patient to bring all medications and blood glucose meter/supplies if applicable.    Primary Care Provider (If no TCC appointment)  Does patient already have a PCP appointment scheduled? No  Care Manager Scheduled PCP office TCM appointment with patient   -If no appointment scheduled: Explain     Specialist  Does the patient have any other follow-up appointment(s) that need to be scheduled? No   -If yes: Care Manager reviewed upcoming specialist appointments with patient: No   -Does the patient need assistance scheduling appointment(s): No      Book By Date: 4/21/2025

## 2025-04-11 ENCOUNTER — OFFICE VISIT (OUTPATIENT)
Dept: INTERNAL MEDICINE CLINIC | Facility: CLINIC | Age: 20
End: 2025-04-11
Payer: COMMERCIAL

## 2025-04-11 VITALS
HEART RATE: 76 BPM | TEMPERATURE: 99 F | WEIGHT: 126 LBS | HEIGHT: 63 IN | BODY MASS INDEX: 22.32 KG/M2 | DIASTOLIC BLOOD PRESSURE: 71 MMHG | OXYGEN SATURATION: 96 % | SYSTOLIC BLOOD PRESSURE: 108 MMHG | RESPIRATION RATE: 18 BRPM

## 2025-04-11 DIAGNOSIS — J98.2 PNEUMOMEDIASTINUM (HCC): Primary | ICD-10-CM

## 2025-04-11 DIAGNOSIS — Z87.891 HISTORY OF NICOTINE VAPING: ICD-10-CM

## 2025-04-11 DIAGNOSIS — J02.0 PHARYNGITIS, STREPTOCOCCAL, ACUTE: ICD-10-CM

## 2025-04-11 PROCEDURE — 99213 OFFICE O/P EST LOW 20 MIN: CPT | Performed by: INTERNAL MEDICINE

## 2025-04-11 NOTE — PROGRESS NOTES
Kira Don is a 19 year old female.  Chief Complaint   Patient presents with    ER F/U     Patient seen at [facility] ER on 04/05/25 for complaints of cough, chest pain, sore throat, difficulty breathing and wheezing     HPI:    Patient presented today for follow up from ER visitt. She was admitted on 4/5 for shortness of breath, cough was found to have pneumomediastinum likely 2/2 vaping. She was started on IV abx, steroids. Able to be weaned off oxygen and meds were changed to PO. She states that her breathing has improved a lot. Has been back to her usual activities. No chest pain, no shortness of breath, cough has also improved.     Current Medications[1]   Past Medical History[2]   Past Surgical History[3]   Social History:  Short Social Hx on File[4]   Family History[5]   Allergies[6]     REVIEW OF SYSTEMS:   Review of Systems   Review of Systems   Constitutional: Negative for activity change, appetite change and fever.   HENT: Negative for congestion and voice change.    Respiratory: Negative for cough and shortness of breath.    Cardiovascular: Negative for chest pain.   Gastrointestinal: Negative for abdominal distention, abdominal pain and vomiting.   Genitourinary: Negative for hematuria.   Skin: Negative for wound.   Psychiatric/Behavioral: Negative for behavioral problems.   Wt Readings from Last 5 Encounters:   04/11/25 126 lb (57.2 kg)   04/05/25 125 lb 4.8 oz (56.8 kg)   02/17/25 131 lb (59.4 kg)   02/04/25 136 lb 9.6 oz (62 kg)   06/24/24 143 lb (64.9 kg) (74%, Z= 0.63)*     * Growth percentiles are based on CDC (Girls, 2-20 Years) data.     Body mass index is 22.32 kg/m².      EXAM:   /71   Pulse 76   Temp 98.5 °F (36.9 °C) (Temporal)   Resp 18   Ht 5' 3\" (1.6 m)   Wt 126 lb (57.2 kg)   SpO2 96%   BMI 22.32 kg/m²   Physical Exam   Constitutional:       Appearance: Normal appearance.   HENT:      Head: Normocephalic.   Eyes:      Conjunctiva/sclera: Conjunctivae  normal.   Breast:  Normal bilateral breast exam. No palpable masses or nodules.   No nipples asymmetry or discharge. No skin changes   Cardiovascular:      Rate and Rhythm: Normal rate and regular rhythm.      Heart sounds: Normal heart sounds. No murmur heard.  Pulmonary:      Effort: Pulmonary effort is normal.      Breath sounds: Normal breath sounds. No rhonchi or rales.   Abdominal:      General: Bowel sounds are normal.      Palpations: Abdomen is soft.      Tenderness: There is no abdominal tenderness.   Musculoskeletal:      Cervical back: Neck supple.      Right lower leg: No edema.      Left lower leg: No edema.   Skin:     General: Skin is warm and dry.   Neurological:      General: No focal deficit present.      Mental Status: He is alert and oriented to person, place, and time. Mental status is at baseline.   Psychiatric:         Mood and Affect: Mood normal.         Behavior: Behavior normal.       ASSESSMENT AND PLAN:     Assessment & Plan  Pneumomediastinum (HCC)  - improving symptoms  - saturating well on room air  - cough meds as needed  - complete steroids taper        Pharyngitis, streptococcal, acute  - complete augmentin for 10 days  - tylenol as needed       History of nicotine vaping  - counseled on cessation  - She as completely stopped vaping        The patient indicates understanding of these issues and agrees to the plan.      Jenna Gonzalez MD   Follow up in 1 year for physical or as needed  This note was created by Dragon voice recognition. Errors in content may be related to improper recognition by the system; efforts to review and correct have been done but errors may still exist. Please be advised the primary purpose of this note is for me to communicate medical care. Standard sentence structure is not always used. Medical terminology and medical abbreviations may be used. There may be grammatical, typographical, and automated fill ins that may have errors missed in  proofreading.          [1]   Current Outpatient Medications   Medication Sig Dispense Refill    benzonatate 200 MG Oral Cap Take 1 capsule (200 mg total) by mouth 3 (three) times daily as needed for cough. 30 capsule 0    amoxicillin clavulanate 875-125 MG Oral Tab Take 1 tablet by mouth 2 (two) times daily for 10 days. 14 tablet 0    predniSONE 10 MG Oral Tab 30 mg daily for 3 days, 20 mg daily for 3 days, 10 mg daily for 3 days 18 tablet 0   [2] History reviewed. No pertinent past medical history.  [3] History reviewed. No pertinent surgical history.  [4]   Social History  Socioeconomic History    Marital status: Single   Tobacco Use    Smoking status: Never    Smokeless tobacco: Never   Vaping Use    Vaping status: Every Day    Substances: THC   Substance and Sexual Activity    Alcohol use: Not Currently    Drug use: Not Currently   Other Topics Concern    Second-hand smoke exposure No    Alcohol/drug concerns No    Violence concerns No     Social Drivers of Health     Food Insecurity: No Food Insecurity (4/5/2025)    NCSS - Food Insecurity     Worried About Running Out of Food in the Last Year: No     Ran Out of Food in the Last Year: No   Transportation Needs: No Transportation Needs (4/5/2025)    NCSS - Transportation     Lack of Transportation: No   Housing Stability: Not At Risk (4/5/2025)    NCSS - Housing/Utilities     Has Housing: Yes     Worried About Losing Housing: No     Unable to Get Utilities: No   [5]   Family History  Problem Relation Age of Onset    Lipids Father     Lipids Paternal Grandmother     Lipids Paternal Grandfather     Heart Disorder Paternal Grandfather         CAD    Cancer Neg     Diabetes Neg     Hypertension Neg     Thyroid disease Neg    [6] No Known Allergies

## 2025-04-11 NOTE — ASSESSMENT & PLAN NOTE
- improving symptoms  - saturating well on room air  - cough meds as needed  - complete steroids taper

## 2025-04-16 ENCOUNTER — TELEPHONE (OUTPATIENT)
Dept: PULMONOLOGY | Facility: CLINIC | Age: 20
End: 2025-04-16

## 2025-04-16 NOTE — TELEPHONE ENCOUNTER
Aparna requesting patient to be seen sooner than next available on May 5, 2025 for hospital follow up.  Please call.  Thank you.

## 2025-04-16 NOTE — TELEPHONE ENCOUNTER
Patient seen in consult by Dr. Menard on 4/6/25.  Per discharge summary 4/7/25 patient to follow up in 1 week with Dr. Menard.  Rescheduled patient with Dr. Menard on 4/18/25 1:45 pm [place]. Appointment info given. She voiced understanding.

## 2025-04-18 ENCOUNTER — OFFICE VISIT (OUTPATIENT)
Dept: PULMONOLOGY | Facility: CLINIC | Age: 20
End: 2025-04-18

## 2025-04-18 VITALS
SYSTOLIC BLOOD PRESSURE: 101 MMHG | WEIGHT: 129 LBS | BODY MASS INDEX: 22.86 KG/M2 | RESPIRATION RATE: 14 BRPM | DIASTOLIC BLOOD PRESSURE: 60 MMHG | OXYGEN SATURATION: 99 % | HEART RATE: 64 BPM | HEIGHT: 63 IN

## 2025-04-18 DIAGNOSIS — J98.2 PNEUMOMEDIASTINUM (HCC): Primary | ICD-10-CM

## 2025-04-18 PROCEDURE — 99213 OFFICE O/P EST LOW 20 MIN: CPT | Performed by: INTERNAL MEDICINE

## 2025-04-18 NOTE — PROGRESS NOTES
Subjective:   Patient ID: Kira Don is a 19 year old female.    HPI  Doing very well overall and completed antibiotics  Denies any chest pain or dyspnea or dyspnea upon exertion  No fever or chills  Minimal occasional cough  Completely stopped smoking and vaping  Overall asymptomatic   History/Other:   Review of Systems   Constitutional: Negative.    HENT: Negative.     Respiratory: Negative.     Cardiovascular: Negative.    Gastrointestinal: Negative.    Neurological: Negative.      Current Medications[1]  Allergies:Allergies[2]    Objective:   Physical Exam  Constitutional:       General: She is not in acute distress.     Appearance: Normal appearance. She is not ill-appearing.   HENT:      Head: Atraumatic.      Nose: Nose normal.      Mouth/Throat:      Mouth: Mucous membranes are moist.      Pharynx: No oropharyngeal exudate or posterior oropharyngeal erythema.   Eyes:      General: No scleral icterus.  Cardiovascular:      Rate and Rhythm: Normal rate.      Heart sounds: No murmur heard.     No gallop.   Pulmonary:      Effort: No respiratory distress.      Breath sounds: No stridor. No wheezing, rhonchi or rales.      Comments: Good air exchange to both lungs  Symmetrical expansion of the chest wall  Clear bilaterally  No subcutaneous emphysema  Chest:      Chest wall: No tenderness.   Musculoskeletal:      Right lower leg: No edema.      Left lower leg: No edema.   Lymphadenopathy:      Cervical: No cervical adenopathy.   Skin:     General: Skin is dry.   Neurological:      Mental Status: She is oriented to person, place, and time.         Assessment & Plan:   1. Pneumomediastinum (HCC)      1-recent hospitalization with pneumomediastinum secondary to significant cough and wheezes  secondary to vaping /bronchospasm  2-s/p strep pharyngitis    Admitted on 4/5/2025 and discharged on 4 /7/25  Completed antibiotics and steroid  Not on any medication for now  Stopped vaping completely  Completely  asymptomatic now with normal lung exam    Emphasized on no further smoking or vaping  Avoid flying or diving for 3 months  Okay to go back to work  No scuba diving lifelong    F/u only As needed     Meds This Visit:  Requested Prescriptions      No prescriptions requested or ordered in this encounter       Imaging & Referrals:  None         [1]   Current Outpatient Medications   Medication Sig Dispense Refill    benzonatate 200 MG Oral Cap Take 1 capsule (200 mg total) by mouth 3 (three) times daily as needed for cough. 30 capsule 0    predniSONE 10 MG Oral Tab 30 mg daily for 3 days, 20 mg daily for 3 days, 10 mg daily for 3 days 18 tablet 0   [2] No Known Allergies

## 2025-07-03 ENCOUNTER — OFFICE VISIT (OUTPATIENT)
Dept: OBGYN CLINIC | Facility: CLINIC | Age: 20
End: 2025-07-03
Payer: COMMERCIAL

## 2025-07-03 VITALS
WEIGHT: 145 LBS | BODY MASS INDEX: 25.69 KG/M2 | HEART RATE: 116 BPM | HEIGHT: 63 IN | DIASTOLIC BLOOD PRESSURE: 59 MMHG | SYSTOLIC BLOOD PRESSURE: 96 MMHG

## 2025-07-03 DIAGNOSIS — N89.8 VAGINAL ITCHING: ICD-10-CM

## 2025-07-03 DIAGNOSIS — N90.89 VULVAR LESION: ICD-10-CM

## 2025-07-03 DIAGNOSIS — R10.2 PELVIC PAIN: Primary | ICD-10-CM

## 2025-07-03 DIAGNOSIS — N89.8 VAGINAL DISCHARGE: ICD-10-CM

## 2025-07-03 DIAGNOSIS — Z30.431 IUD CHECK UP: ICD-10-CM

## 2025-07-03 PROCEDURE — 99213 OFFICE O/P EST LOW 20 MIN: CPT | Performed by: ADVANCED PRACTICE MIDWIFE

## 2025-07-03 PROCEDURE — 81025 URINE PREGNANCY TEST: CPT | Performed by: ADVANCED PRACTICE MIDWIFE

## 2025-07-03 NOTE — PROGRESS NOTES
Subjective:   Patient ID: Kira Don is a 19 year old female.    Kira presents with concern for cramps, discharge, itching. Feels like has scratches on vulva from itching. Symptoms started 2 weeks ago. Denies fevers.    Sexually active with male partner. Kyleena IUD placed 6/2023 for birth control        History/Other:   Review of Systems   All other systems reviewed and are negative.    Current Medications[1]  Allergies:Allergies[2]    Objective:   Physical Exam  Vitals and nursing note reviewed.   Constitutional:       General: She is not in acute distress.     Appearance: Normal appearance. She is not ill-appearing, toxic-appearing or diaphoretic.   Pulmonary:      Effort: Pulmonary effort is normal.   Genitourinary:     Vagina: No signs of injury and foreign body. Vaginal discharge (white, thin discharge) present. No erythema, tenderness, bleeding, lesions or prolapsed vaginal walls.      Cervix: No cervical motion tenderness, discharge, friability, lesion, erythema, cervical bleeding or eversion.          Comments: Kyleena IUD strings present  Neurological:      Mental Status: She is alert and oriented to person, place, and time.   Psychiatric:         Mood and Affect: Mood normal.         Behavior: Behavior normal.         Thought Content: Thought content normal.         Judgment: Judgment normal.         Assessment & Plan:   1. Pelvic pain    2. Vulvar lesion    3. Vaginal itching    4. Vaginal discharge    5. IUD check up        Orders Placed This Encounter   Procedures    Urine Pregnancy Test    HSV 1/2 Subtype by PCR (Lesion-Only)    Chlamydia/Gc Amplification    Vaginitis Vaginosis PCR Panel       Meds This Visit:  Requested Prescriptions      No prescriptions requested or ordered in this encounter       Imaging & Referrals:  None    Vaginal culture and GC/CT collected. Will treat if indicated.  Lesions appear to be scratches. HSV culture collected to rule out HSV       [1]   Current  Outpatient Medications   Medication Sig Dispense Refill    benzonatate 200 MG Oral Cap Take 1 capsule (200 mg total) by mouth 3 (three) times daily as needed for cough. 30 capsule 0    predniSONE 10 MG Oral Tab 30 mg daily for 3 days, 20 mg daily for 3 days, 10 mg daily for 3 days 18 tablet 0   [2] No Known Allergies

## 2025-07-04 LAB
BV BACTERIA DNA VAG QL NAA+PROBE: NEGATIVE
C GLABRATA DNA VAG QL NAA+PROBE: NEGATIVE
C KRUSEI DNA VAG QL NAA+PROBE: NEGATIVE
CANDIDA DNA VAG QL NAA+PROBE: POSITIVE
T VAGINALIS DNA VAG QL NAA+PROBE: NEGATIVE

## 2025-07-07 ENCOUNTER — TELEPHONE (OUTPATIENT)
Dept: OBGYN CLINIC | Facility: CLINIC | Age: 20
End: 2025-07-07

## 2025-07-07 LAB
C TRACH DNA SPEC QL NAA+PROBE: NEGATIVE
N GONORRHOEA DNA SPEC QL NAA+PROBE: NEGATIVE

## 2025-07-07 NOTE — TELEPHONE ENCOUNTER
----- Message from Celeste Iniguez sent at 7/7/2025  1:39 PM CDT -----  Please call patient. She has a yeast infection. Chlamydia and gonorrhea testing were negative. I sent diflucan to her pharmacy. Thanks!  ----- Message -----  From: Cynthia Barron MA  Sent: 7/3/2025   5:17 PM CDT  To: Celeste Iniguez CNM

## 2025-07-07 NOTE — TELEPHONE ENCOUNTER
Pt verified name and .     Informed pt of +yeast on vaginal culture. Informed pt of rx sent to pharmacy. Advised pt to take entire course of medication, even if feeling relief of symptoms. Pt verbalized understanding and agreed.

## 2025-07-08 LAB
HSV 1 NAA: NEGATIVE
HSV 1 NAA: NEGATIVE
HSV 2 NAA: NEGATIVE
HSV 2 NAA: NEGATIVE

## 2025-08-25 ENCOUNTER — OFFICE VISIT (OUTPATIENT)
Dept: OBGYN CLINIC | Facility: CLINIC | Age: 20
End: 2025-08-25

## 2025-08-25 VITALS
HEART RATE: 81 BPM | HEIGHT: 63 IN | SYSTOLIC BLOOD PRESSURE: 106 MMHG | BODY MASS INDEX: 26.01 KG/M2 | WEIGHT: 146.81 LBS | DIASTOLIC BLOOD PRESSURE: 68 MMHG

## 2025-08-25 DIAGNOSIS — N89.8 VAGINAL ITCHING: Primary | ICD-10-CM

## 2025-08-25 DIAGNOSIS — Z11.3 SCREENING EXAMINATION FOR STI: ICD-10-CM

## 2025-08-25 PROCEDURE — 99213 OFFICE O/P EST LOW 20 MIN: CPT | Performed by: ADVANCED PRACTICE MIDWIFE

## 2025-08-26 LAB
BV BACTERIA DNA VAG QL NAA+PROBE: POSITIVE
C GLABRATA DNA VAG QL NAA+PROBE: NEGATIVE
C KRUSEI DNA VAG QL NAA+PROBE: NEGATIVE
C TRACH DNA SPEC QL NAA+PROBE: NEGATIVE
CANDIDA DNA VAG QL NAA+PROBE: NEGATIVE
N GONORRHOEA DNA SPEC QL NAA+PROBE: NEGATIVE
T VAGINALIS DNA VAG QL NAA+PROBE: NEGATIVE

## (undated) NOTE — LETTER
9/3/2019              [address] [place] [address] Drive [address]         To Whom It May Concern,    Criss Camacho was seen in my office today.  Due to her current viral illness we were unable to vaccinate at today's

## (undated) NOTE — LETTER
Date & Time: 3/6/2023, 7:34 PM  Patient: Salvatore Dandy  Encounter Provider(s):    Severino Machado MD       To Whom It May Concern:    Aubrey Jean-Baptiste was seen and treated in our department on 3/6/2023. She should not return to school until She has not run a fever for at least 24 hours.     If you have any questions or concerns, please do not hesitate to call.        _____________________________  Physician/APC Signature

## (undated) NOTE — LETTER
Date & Time: 11/25/2022, 7:00 PM  Patient: Vlad Diaz  Encounter Provider(s):    Ivana Hook MD       To Whom It May Concern:    Jaime Gomez was seen and treated in our department on 11/25/2022. She may return to school on Tues 11/29/22 if fever free for 24 hours without the use of medication.      If you have any questions or concerns, please do not hesitate to call.        _____________________________  Physician/APC Signature

## (undated) NOTE — LETTER
2023              Audrea Minium ( 2005)         [address] [facility]       Immunization History   Administered Date(s) Administered    Covid-19 Vaccine Pfizer 30 mcg/0.3 ml 2021, 2021    Covid-19 Vaccine Pfizer Bivalent 30mcg/0.3mL 2023    DTAP 2005, 2005, 2005, 10/13/2006    DTAP-IPV 2010    FLULAVAL 6 months & older 0.5 ml Prefilled syringe (94976) 10/19/2021    FLUMIST Intranasal Influenza 10/14/2014    FLUZONE 6 months and older PFS 0.5 ml (96079) 10/15/2015, 10/19/2016    HEP A 2012    HEP A,Ped/Adol,(2 Dose) 10/14/2014    HEP B 2005, 2005, 2005    HEP B, Ped/Adol 2019    HIB 2005, 2005, 2005, 10/13/2006    Hpv Virus Vaccine 9 Sharmila Im 2019, 10/19/2021    IPV 2005, 2005, 2005    Influenza 2007, 10/17/2008, 10/13/2009, 2010, 2011, 2012    Influenza Vaccine, Preserv Free 10/13/2006, 10/08/2013    Influenza Virus Vaccine, H1N1 2009    MMR 2005, 2010    Meningococcal B, Omv 2023    Meningococcal-Menactra 10/19/2016    Meningococcal-Menveo 2month-55yr 10/19/2021    Pneumococcal Vaccine, Conjugate 2005, 2005, 2005, 2006    Rotavirus 3 Dose 2005    TDAP 2015    Varicella 2006, 2010    Varicella Vaccine 2019        [facility] [place] [address] [phone]

## (undated) NOTE — MR AVS SNAPSHOT
[address], [address] [facility]  [address] [facility]  [phone]               Thank you for choosing us for your health care visit with Kd Lindquist DO.   We are glad to serve you and happy to provide you you have any questions related to insurance coverage. Thank you.          Reason for Today's Visit     Other           Medical Issues Discussed Today     Cough    Periodic headache syndrome, not intractable          Instructions and Information about Your

## (undated) NOTE — LETTER
[facility]  [address] [facility] [address]  Dept: [phone]  Loc: [phone] April 7, 2025    Patient: Kira Don   Date of Visit: 4/5/2025       To Whom It May Concern:         Kira Don was admitted in hospital from 4/5/2025  3:28 PM to 4/7/2025 for treatment of a medical condition. Please excuse Kira Don from attending work from 4/5/2025  3:28 PM through 4/14/2025. she should avoid heavy lifting until cleared for by Pulmonology and/or PCP. The patient may return to work thereafter.     If any future correspondence is necessary, please communicate with patient's regular primary care physician.        Thank you,           So Galdamez MD  Hospitalist / Internal Medicine  4/7/2025  [phone]

## (undated) NOTE — LETTER
9/5/2019              [address] [address]       India Sd was seen in the office today for Mono. She may return to school 1/2 days as tolerated as soon as possible.  Please provide her wit

## (undated) NOTE — LETTER
[facility] [employer] of Child Health Examination       Student's Name  Katerina Mcconnell Signature                                                                                                                                   Title                           Date     Signature Female School   Grade Level/ID#  {DMG_GRADE:1366}   HEALTH HISTORY          TO BE COMPLETED AND SIGNED BY PARENT/GUARDIAN AND VERIFIED BY HEALTH CARE PROVIDER    ALLERGIES  (Food, drug, insect, other)  Patient has no known allergies.  MEDICATION  (List all PHYSICAL EXAMINATION REQUIREMENTS (head circumference if <33 years old):   Temp 98.3 °F (36.8 °C) (Tympanic)   Resp 20   Wt 60.3 kg (133 lb)     DIABETES SCREENING  BMI>85% age/sex  {YES_NO:585::\"No\"} And any two of the following:  Family History {YES_N Eyes {YES:829::\"Yes\"}     Screen result:   Genito-Urinary {YES:829::\"Yes\"}  LMP   Nose {YES:829::\"Yes\"}  Neurological {YES:829::\"Yes\"}    Throat {YES:829::\"Yes\"}  Musculoskeletal {YES:829::\"Yes\"}    Mouth/Dental {YES:829::\"Yes\"}  Spinal exami Signature                                                                                Date  9/30/2019   Address/Phone  [place] , [facility], [address],  [address], [address] [facility] [address]  [phone]

## (undated) NOTE — LETTER
AUTHORIZATION FOR SURGICAL OPERATION OR OTHER PROCEDURE    1. I hereby authorize Gale Bernal CNM , and [employer] [facility] staff assigned to my case to perform the following operation and/or procedure at the [place] [facility]:    IUD INSERTION _______________________________________________________________________________      _______________________________________________________________________________________________    2. My physician has explained the nature and purpose of the operation or other procedure, possible alternative methods of treatment, the risks involved, and the possibility of complication to me. I acknowledge that no guarantee has been made as to the result that may be obtained. 3.  I recognize that, during the course of this operation, or other procedure, unforseen conditions may necessitate additional or different procedure than those listed above. I, therefore, further authorize and request that the above named physician, his/her physician assistants or designees perform such procedures as are, in his/her professional opinion, necessary and desirable. 4.  Any tissue or organs removed in the operation or other procedure may be disposed of by and at the discretion of the [place] [facility] and [facility]. 5.  I understand that in the event of a medical emergency, I will be transported by local paramedics to [facility] or other hospital emergency department. 6.  I certify that I have read and fully understand the above consent to operation and/or other procedure. 7.  I acknowledge that my physician has explained sedation/analgesia administration to me including the risks and benefits. I consent to the administration of sedation/analgesia as may be necessary or desirable in the judgement of my physician. Witness signature: ___________________________________________________ Date:  ______/______/_____                    Time:  ________ A. M.  P.M. Patient Name:  ______________________________________________________  (please print)      Patient signature:  ___________________________________________________             Relationship to Patient:           []  Parent    Responsible person                          []  Spouse  In case of minor or                    [] Other  _____________   Incompetent name:  __________________________________________________                               (please print)      _____________      Responsible person  In case of minor or  Incompetent signature:  _______________________________________________    Statement of Physician  My signature below affirms that prior to the time of the procedure, I have explained to the patient and/or his/her guardian, the risks and benefits involved in the proposed treatment and any reasonable alternative to the proposed treatment. I have also explained the risks and benefits involved in the refusal of the proposed treatment and have answered the patient's questions.                         Date:  ______/______/_______  Provider                      Signature:  __________________________________________________________       Time:  ___________ A.M    P.M.

## (undated) NOTE — LETTER
Date & Time: 4/19/2023, 7:21 PM  Patient: Christine Perez  Encounter Provider(s):    Gillian Bustillos MD       To Whom It May Concern:    Aayush Storey was seen and treated in our department on 4/19/2023. Please excuse Júnior Raymundo from school until fever free for 24 hours without any fever reducing medication. If you have any questions or concerns, please do not hesitate to call.       Grabiel Almanza MD    Physician/APC Signature

## (undated) NOTE — LETTER
10/19/2021              Adeline Burden         To Whom it may concern:     This is to certify that Francois Ramirez had an appointment on 10/19/2021 at 10:10 AM with Juliano Gunderson

## (undated) NOTE — LETTER
9/4/2019              Scarlet Brown        [address] [place] [address] [place] [id number]         To Whom It May Concern,    Kateyndangie Ja has been diagnosed with Mononucleosis.  Please allow her extra time to complete school assignments an

## (undated) NOTE — LETTER
1/24/2022              Adeline Burden         To Whom it may concern: This is to certify that Yuli Bautista had an appointment on 1/24/2022 with Otf Dutta DO.   She has

## (undated) NOTE — LETTER
[facility] [employer] of Child Health Examination       Student's Name  Darío Patel Signature                                                                                                                                   Title                           Date     Signature Female School   Grade Level/ID#     HEALTH HISTORY          TO BE COMPLETED AND SIGNED BY PARENT/GUARDIAN AND VERIFIED BY HEALTH CARE PROVIDER    ALLERGIES  (Food, drug, insect, other)  Patient has no known allergies.  MEDICATION  (List all prescribed or ta PHYSICAL EXAMINATION REQUIREMENTS (head circumference if <33 years old):   /70   Pulse 86   Ht 5' (1.524 m)   Wt 57.7 kg (127 lb 4 oz)   BMI 24.85 kg/m²     DIABETES SCREENING  BMI>85% age/sex  No And any two of the following:  Family History No Respiratory Yes                   Diagnosis of Asthma: No Mental Health Yes        Currently Prescribed Asthma Medication:            Quick-relief  medication (e.g. Short Acting Beta Antagonist): No          Controller medication (e.g. inhaled corticostero

## (undated) NOTE — LETTER
Name:  Denise Nicolas School Year:  {GRADE:1366} Class: Student ID No.:   [place]  [place] [address] Phone:  [phone] (home) [phone] (work) :  14year old   Name Relationship Lgl [address] [facility] 13. Does anyone in your family have a heart problem, pacemaker, or implanted defibrillator? 12. Has anyone in your family had unexplained fainting, seizures, or near drowning?      BONE AND JOINT QUESTIONS Yes No   17. Have you ever had an injury to a b after being hit or falling? 39.Have you ever been unable to move your arms / legs after being hit /fall? 40. Have you ever become ill while exercising in the heat?     41. Do you get frequent muscle cramps when exercising? 42.  Do you or someone · Location of point of maximal impulse (PMI) {y/n:123::\"Yes\"}    Pulses {y/n:123::\"Yes\"}    Lungs {y/n:123::\"Yes\"}    Abdomen {y/n:123::\"Yes\"}    Genitourinary (males only)* {N/A:2593}    Skin:  HSV, lesions suggestive of MRSA, tinea corporis {y/n: that I/our student will not use performance-enhancing substances as defined in the [facility] Performance-Enhancing Substance Testing Program Protocol.  We have reviewed the policy and understand that I/our student may be asked to submit to testing for the presen

## (undated) NOTE — LETTER
VACCINE ADMINISTRATION RECORD  PARENT / GUARDIAN APPROVAL  Date: 2019  Vaccine administered to: Marlen Maddox     : 2005    MRN: [id number]    A copy of the appropriate Centers for Disease Control and Prevention Vaccine Information

## (undated) NOTE — LETTER
9/30/2019              [address] [facility] [address]         Immunization History   Administered Date(s) Administered   • DTAP 02/23/2005, 06/20/2005, 07/25/2005, 10/13/2006   • DTAP-IPV 03/04/20

## (undated) NOTE — LETTER
VACCINE ADMINISTRATION RECORD  PARENT / GUARDIAN APPROVAL  Date: 10/19/2021  Vaccine administered to: Yasmeen Sanchez     : 2005    MRN: [id number]    A copy of the appropriate Centers for Disease Control and Prevention Vaccine [employer]format[employer]

## (undated) NOTE — MR AVS SNAPSHOT
[place] [address], [address] [address] [phone]               Thank you for choosing us for your health care visit with EMERALD Ward.   We are glad to serve you and happy to provide you w Children's Oral Suspension= 160 mg in each tsp  Childrens Chewable =80 mg  Jr Strength Chewables= 160 mg  Regular Strength Caplet = 325 mg  Extra Strength Caplet = 500 mg                                                         Tylenol suspension   Children 24-35 lbs                2.5 ml                            1 tsp                             1  36-47 lbs                                                      1&1/2 tsp           48-59 lbs                                                      2 tsp [facility] (MRI Only)  [address]    It is the patient's responsibility to check with and follow their insurance company's guidelines for prior authorization for this test.  You may be held responsible for payment in full if pr activity the norm for their family.   o Create a home where healthy choices are available and encouraged  o Make it fun – find ways to engage your children such as:  o playing a game of tag  o cooking healthy meals together  o creating a [employer] shopping li

## (undated) NOTE — LETTER
[facility] [employer] Child Health Examination       Student's Name  Estela Seat professional) verifying above immunization history must sign below.   Signature                                                                                           Title                           Date  10/19/2021   Signature Kaushal Cunningham Birth Date  12/23/2005  Sex  Female School   Grade Level/ID#  11th Grade   HEALTH HISTORY          TO BE COMPLETED AND SIGNED BY PARENT/GUARDIAN AND VERIFIED BY HEALTH CARE PROVIDER    ALLERGIES  (Food, drug, insect, other)  Patient has no known EXAMINATION REQUIREMENTS (head circumference if <33 years old):   /74   Pulse 77   Ht 5' 2.25\"   Wt 67.2 kg (148 lb 1.6 oz)   BMI 26.87 kg/m²     DIABETES SCREENING  BMI>85% age/sex  Yes  And any two of the following:  Family History No    Ethnic M Diagnosis of Asthma: No Mental Health Yes        Currently Prescribed Asthma Medication:            Quick-relief  medication (e.g. Short Acting Beta Antagonist): No          Controller medication (e.g. inhaled corticosteroid):   No Other   NE

## (undated) NOTE — LETTER
VACCINE ADMINISTRATION RECORD  PARENT / GUARDIAN APPROVAL  Date: 5/3/2023  Vaccine administered to: Marimar Zamorano     : 2005    MRN: [id number]    A copy of the appropriate Centers for Disease Control and Prevention Vaccine Information statement has been provided. I have read or have had explained the information about the diseases and the vaccines listed below. There was an opportunity to ask questions and any questions were answered satisfactorily. I believe that I understand the benefits and risks of the vaccine cited and ask that the vaccine(s) listed below be given to me or to the person named above (for whom I am authorized to make this request). VACCINES ADMINISTERED:  Men B    I have read and hereby agree to be bound by the terms of this agreement as stated above. My signature is valid until revoked by me in writing. This document is signed by , relationship: Self on 5/3/2023.:                                                                                                                                         Parent / [place]e Signature                                                Date    Magnus Cesar served as a witness to authentication that the identity of the person signing electronically is in fact the person represented as signing. This document was generated by Magnus Cesar on 5/3/2023.

## (undated) NOTE — LETTER
[facility] [employer] Child Health Examination       Student's Name  Shoshana Wilson Signature                         ***                                                                                                          Title                           Date     Signature 12/23/2005  Sex  Female School   Grade Level/ID#  9th Grade   HEALTH HISTORY          TO BE COMPLETED AND SIGNED BY PARENT/GUARDIAN AND VERIFIED BY HEALTH CARE PROVIDER    ALLERGIES  (Food, drug, insect, other)  Patient has no known allergies.  MEDICATION PHYSICAL EXAMINATION REQUIREMENTS (head circumference if <33 years old):   /69   Pulse 101   Temp (!) 100.6 °F (38.1 °C) (Tympanic)   Resp 20   Ht 5' 1.5\" (1.562 m)   Wt 59 kg (130 lb)   BMI 24.17 kg/m²     DIABETES SCREENING  BMI>85% age/sex  No A Cardiovascular/HTN Yes  Nutritional status Yes    Respiratory Yes                   Diagnosis of Asthma: No Mental Health Yes        Currently Prescribed Asthma Medication:            Quick-relief  medication (e.g. Short Acting Beta Antagonist):  No

## (undated) NOTE — LETTER
8/26/2019              Shaista Ontiveros        [address] [place] [id number]         To Whom It May Concern,    Felisha Stephens was seen in my office today with a respiratory infection.  She may return to school when she is feel